# Patient Record
Sex: FEMALE | Race: WHITE | NOT HISPANIC OR LATINO | Employment: OTHER | URBAN - METROPOLITAN AREA
[De-identification: names, ages, dates, MRNs, and addresses within clinical notes are randomized per-mention and may not be internally consistent; named-entity substitution may affect disease eponyms.]

---

## 2017-03-13 ENCOUNTER — RADIANT APPOINTMENT (OUTPATIENT)
Dept: GENERAL RADIOLOGY | Facility: CLINIC | Age: 62
End: 2017-03-13
Attending: FAMILY MEDICINE
Payer: COMMERCIAL

## 2017-03-13 ENCOUNTER — OFFICE VISIT (OUTPATIENT)
Dept: FAMILY MEDICINE | Facility: CLINIC | Age: 62
End: 2017-03-13
Payer: COMMERCIAL

## 2017-03-13 DIAGNOSIS — R68.89 FLU-LIKE SYMPTOMS: Primary | ICD-10-CM

## 2017-03-13 DIAGNOSIS — H61.22 IMPACTED CERUMEN OF LEFT EAR: ICD-10-CM

## 2017-03-13 PROBLEM — Z71.89 ADVANCED DIRECTIVES, COUNSELING/DISCUSSION: Status: ACTIVE | Noted: 2017-03-13

## 2017-03-13 PROCEDURE — 69210 REMOVE IMPACTED EAR WAX UNI: CPT | Performed by: FAMILY MEDICINE

## 2017-03-13 PROCEDURE — 99213 OFFICE O/P EST LOW 20 MIN: CPT | Mod: 25 | Performed by: FAMILY MEDICINE

## 2017-03-13 PROCEDURE — 71020 XR CHEST 2 VW: CPT

## 2017-03-13 RX ORDER — CODEINE PHOSPHATE AND GUAIFENESIN 10; 100 MG/5ML; MG/5ML
1-2 SOLUTION ORAL EVERY 4 HOURS PRN
Qty: 180 ML | Refills: 0 | Status: SHIPPED | OUTPATIENT
Start: 2017-03-13 | End: 2019-04-08

## 2017-03-13 RX ORDER — AZITHROMYCIN 250 MG/1
TABLET, FILM COATED ORAL
Qty: 6 TABLET | Refills: 0 | Status: SHIPPED | OUTPATIENT
Start: 2017-03-13 | End: 2019-03-19

## 2017-03-13 RX ORDER — ACETAMINOPHEN 500 MG
500-1000 TABLET ORAL EVERY 6 HOURS PRN
COMMUNITY
End: 2024-08-14

## 2017-03-13 NOTE — NURSING NOTE
"Chief Complaint   Patient presents with     Cough     URI       Initial /69 (Cuff Size: Adult Regular)  Pulse 97  Temp 99.2  F (37.3  C) (Oral)  Ht 5' 8\" (1.727 m)  Wt 193 lb (87.5 kg)  SpO2 95%  BMI 29.35 kg/m2 Estimated body mass index is 28.59 kg/(m^2) as calculated from the following:    Height as of 5/23/15: 5' 8\" (1.727 m).    Weight as of 10/24/16: 188 lb (85.3 kg).  Medication Reconciliation: complete   Staff and patient were masked during visit.    Natasha Sheets LPN    "

## 2017-03-13 NOTE — MR AVS SNAPSHOT
After Visit Summary   3/13/2017    Christ Ca    MRN: [id number]           Patient Information     Date Of Birth          1955        Visit Information        Provider Department      3/13/2017 10:10 AM Jorge Alberto Hudson MD [facility]        Today's Diagnoses     Flu-like symptoms    -  1    Advanced directives, counseling/discussion          Care Instructions    1. I will contact you with the report of your chest xray.    2. Take Zithromax.    3. Robitussin with codeine as needed - no driving within 4 hours of taking this. Do not mix with alcohol or other sedatives.    4. Send message if not better by next week.          Follow-ups after your visit        Who to contact     If you have questions or need follow up information about today's clinic visit or your schedule please contact [facility] directly at [phone].  Normal or non-critical lab and imaging results will be communicated to you by MyChart, letter or phone within 4 business days after the clinic has received the results. If you do not hear from us within 7 days, please contact the clinic through [employer] or phone. If you have a critical or abnormal lab result, we will notify you by phone as soon as possible.  Submit refill requests through [employer] or call your pharmacy and they will forward the refill request to us. Please allow 3 business days for your refill to be completed.          Additional Information About Your Visit        MyChart Information     [employer] gives you secure access to your electronic health record. If you see a primary care provider, you can also send messages to your care team and make appointments. If you have questions, please call your primary care clinic.  If you do not have a primary care provider, please call [phone] and they will assist you.        Care EveryWhere ID     This is your Care EveryWhere ID. This could be used by other organizations to access your [place]  medical records  [id number]         Blood Pressure from Last 3 Encounters:   10/24/16 129/75   11/13/15 136/82   05/23/15 130/72    Weight from Last 3 Encounters:   10/24/16 188 lb (85.3 kg)   11/13/15 198 lb 6.4 oz (90 kg)   06/04/15 195 lb (88.5 kg)              We Performed the Following     XR Chest 2 Views          Today's Medication Changes          These changes are accurate as of: 3/13/17 11:41 AM.  If you have any questions, ask your nurse or doctor.               Start taking these medicines.        Dose/Directions    azithromycin 250 MG tablet   Commonly known as:  ZITHROMAX   Used for:  Flu-like symptoms   Started by:  Jorge Alberto Hudson MD        Two tablets first day, then one tablet daily for four days.   Quantity:  6 tablet   Refills:  0       guaiFENesin-codeine 100-10 MG/5ML Soln solution   Commonly known as:  ROBITUSSIN AC   Used for:  Flu-like symptoms   Started by:  Jorge Alberto Hudson MD        Dose:  1-2 tsp.   Take 5-10 mLs by mouth every 4 hours as needed for cough   Quantity:  180 mL   Refills:  0            Where to get your medicines      These medications were sent to [facility] [address]  [id number] [place] [address]     Phone:  [phone]     azithromycin 250 MG tablet         Some of these will need a paper prescription and others can be bought over the counter.  Ask your nurse if you have questions.     Bring a paper prescription for each of these medications     guaiFENesin-codeine 100-10 MG/5ML Soln solution                Primary Care Provider Office Phone # Fax #    Latanya Day [phone]       [address] [address]        Thank you!     Thank you for choosing [facility]  for your care. Our goal is always to provide you with excellent care. Hearing back from our patients is one way we can continue to improve our services. Please take a few  minutes to complete the written survey that you may receive in the mail after your visit with us. Thank you!             Your Updated Medication List - Protect others around you: Learn how to safely use, store and throw away your medicines at www.disposemymeds.org.          This list is accurate as of: 3/13/17 11:41 AM.  Always use your most recent med list.                   Brand Name Dispense Instructions for use    albuterol 108 (90 BASE) MCG/ACT Inhaler    PROAIR HFA/PROVENTIL HFA/VENTOLIN HFA    1 Inhaler    Inhale 2 puffs into the lungs every 6 hours       ALLEGRA PO          azithromycin 250 MG tablet    ZITHROMAX    6 tablet    Two tablets first day, then one tablet daily for four days.       guaiFENesin-codeine 100-10 MG/5ML Soln solution    ROBITUSSIN AC    180 mL    Take 5-10 mLs by mouth every 4 hours as needed for cough       MUCINEX PO          NYQUIL PO          TYLENOL 500 MG tablet   Generic drug:  acetaminophen      Take 500-1,000 mg by mouth every 6 hours as needed for mild pain

## 2017-03-13 NOTE — PROGRESS NOTES
"HPI:    Christ Ca is an 62 year old female who presents for evaluation of cold symptoms.    Duration: since 3/11/17  Fever: no  Cough: Productive of non bloody sputum  Shortness of breath: yes  History of asthma or COPD: no  Sore throat: no  Runny nose: yes  Ear pain: left ear    ROS:    Per HPI    SH:    Non smoker    Exam:    /69 (Cuff Size: Adult Regular)  Pulse 97  Temp 99.2  F (37.3  C) (Oral)  Ht 5' 8\" (1.727 m)  Wt 193 lb (87.5 kg)  SpO2 95%  BMI 29.35 kg/m2  Gen: Healthy appearing female in no acute distress  ENT: TM's normal after removing wax from the left ear canal. Oropharynx normal. Oral mucosa moist without lesions.  Eyes: Conjunctiva and sclera normal. Pupils react normally to light. No nystagmus.  Neck: No enlarged lymph nodes, thyromegally or other masses.  Lungs: Good air movement and otherwise clear.  CV: Heart RRR with no murmurs. No JVD, carotid bruits or leg edema.    XR CHEST 2 VW 3/13/2017 11:05 AM     HISTORY: Cough.     COMPARISON: None.     FINDINGS: No airspace consolidation, pleural effusion or pneumothorax.  Normal heart size.         IMPRESSION: No acute cardiopulmonary abnormality.     JUN NGUYEN MD    Assessment and Plan - Decision Making    1. Flu-like symptoms  I think she has viral illness. But because of the shortness of breath and indistinct cardiac borders on CXR, I placed her on Zpak. Robitussin AC for symptom relief.  - XR Chest 2 Views  - guaiFENesin-codeine (ROBITUSSIN AC) 100-10 MG/5ML SOLN solution; Take 5-10 mLs by mouth every 4 hours as needed for cough  Dispense: 180 mL; Refill: 0  - azithromycin (ZITHROMAX) 250 MG tablet; Two tablets first day, then one tablet daily for four days.  Dispense: 6 tablet; Refill: 0    2. Impacted cerumen of left ear  Nursing staff attempted irrigation without success. I used alligator clamp to remove a large, hard piece of wax. She felt better.   - REMOVE IMPACTED CERUMEN      Written instructions given as " follows:    Patient Instructions   1. I will contact you with the report of your chest xray.    2. Take Zithromax.    3. Robitussin with codeine as needed - no driving within 4 hours of taking this. Do not mix with alcohol or other sedatives.    4. Send message if not better by next week.

## 2017-03-13 NOTE — PATIENT INSTRUCTIONS
1. I will contact you with the report of your chest xray.    2. Take Zithromax.    3. Robitussin with codeine as needed - no driving within 4 hours of taking this. Do not mix with alcohol or other sedatives.    4. Send message if not better by next week.

## 2017-03-14 VITALS
BODY MASS INDEX: 29.25 KG/M2 | TEMPERATURE: 99.2 F | HEART RATE: 97 BPM | DIASTOLIC BLOOD PRESSURE: 69 MMHG | HEIGHT: 68 IN | WEIGHT: 193 LBS | SYSTOLIC BLOOD PRESSURE: 132 MMHG | OXYGEN SATURATION: 95 %

## 2017-03-24 ENCOUNTER — TELEPHONE (OUTPATIENT)
Dept: FAMILY MEDICINE | Facility: CLINIC | Age: 62
End: 2017-03-24

## 2017-04-04 ENCOUNTER — TELEPHONE (OUTPATIENT)
Dept: FAMILY MEDICINE | Facility: CLINIC | Age: 62
End: 2017-04-04

## 2017-04-04 NOTE — LETTER
[facility]     [address] [place]  [address]    Phone:  [phone]          Christ MOREIRA Roby                                                                [address] [facility] [address]              April 4, 2017             Our records indicate that you have not scheduled for a(n)mammogram and annual female exam which was recommended by your health care team. Monitoring and managing your preventative and chronic health conditions are very important to us.     You are also due for a fasting lab appointment one week prior to the appointment with the provider.  Nothing to eat or drink, except water 10-12 hours prior to the appointment with the provider.     If you are receiving any of these services at another site please bring in a copy at your next visit so we can get it scanned into your chart.       Please call [phone] or message us through your [employer] account to schedule an appointment or provide information for your chart.     I look forward to seeing you and working with you on your health care needs.     Sincerely,       Jorge Alberto Hudson MD/miryam              *If you have already scheduled an appointment, please disregard this reminder

## 2017-04-04 NOTE — TELEPHONE ENCOUNTER
Panel Management Review      Patient has the following on her problem list:     Asthma review     ACT Total Scores 10/24/2016   ACT TOTAL SCORE -   ASTHMA ER VISITS -   ASTHMA HOSPITALIZATIONS -   ACT TOTAL SCORE (Goal Greater than or Equal to 20) 25   In the past 12 months, how many times did you visit the emergency room for your asthma without being admitted to the hospital? 0   In the past 12 months, how many times were you hospitalized overnight because of your asthma? 0      1. Is Asthma diagnosis on the Problem List? Yes    2. Is Asthma listed on Health Maintenance? Yes    3. Patient is due for:  ACT      Composite cancer screening  Chart review shows that this patient is due/due soon for the following Pap Smear and Mammogram  Summary:    Patient is due/failing the following:   MAMMOGRAM and PAP    Action needed:   Patient needs office visit for a physical with fasting labs.    Type of outreach:    Sent letter.    Questions for provider review:    None                                                                                                                                    Natasha Sheets LPN       Chart routed to Care Team .

## 2017-04-10 NOTE — TELEPHONE ENCOUNTER
Call attempt 2, no answer. Panel management done 4/4/17, noted letter sent out. See latest encounter. Rerouting to TC. NX

## 2017-07-06 ENCOUNTER — TELEPHONE (OUTPATIENT)
Dept: FAMILY MEDICINE | Facility: CLINIC | Age: 62
End: 2017-07-06

## 2017-07-06 NOTE — TELEPHONE ENCOUNTER
Patient has been contacted previously with no response. Will mail letter.Nicolasa Tobias MA/CIELO

## 2017-07-06 NOTE — LETTER
[facility]  [address] [facility] [address] [phone]          July 6, 2017    Christ Ca  [address] [facility] [address]    Dear Christ,          Our records indicate that you have not scheduled for a(n)Mammogram which was recommended by your health care team. Monitoring and managing your preventative and chronic health conditions are very important to us.       If you have received your health care elsewhere, please provide us with that information so it can be documented in your chart.    Please call [phone] or message us through your [employer] account to schedule an appointment or provide information for your chart.     We look forward to seeing you and working with you on your health care needs.     Sincerely,   Jorge Alberto Hudson MD/neda          *If you have already scheduled an appointment, please disregard this reminder

## 2017-10-01 ENCOUNTER — HEALTH MAINTENANCE LETTER (OUTPATIENT)
Age: 62
End: 2017-10-01

## 2018-09-24 ENCOUNTER — OFFICE VISIT (OUTPATIENT)
Dept: FAMILY MEDICINE | Facility: CLINIC | Age: 63
End: 2018-09-24
Payer: COMMERCIAL

## 2018-09-24 VITALS
SYSTOLIC BLOOD PRESSURE: 122 MMHG | WEIGHT: 170 LBS | BODY MASS INDEX: 25.85 KG/M2 | DIASTOLIC BLOOD PRESSURE: 82 MMHG | RESPIRATION RATE: 16 BRPM | OXYGEN SATURATION: 98 % | HEART RATE: 75 BPM

## 2018-09-24 DIAGNOSIS — K59.00 CONSTIPATION, UNSPECIFIED CONSTIPATION TYPE: Primary | ICD-10-CM

## 2018-09-24 PROCEDURE — 99213 OFFICE O/P EST LOW 20 MIN: CPT | Performed by: INTERNAL MEDICINE

## 2018-09-24 ASSESSMENT — PAIN SCALES - GENERAL: PAINLEVEL: MILD PAIN (3)

## 2018-09-24 NOTE — PATIENT INSTRUCTIONS
For helping with keeping stool pattern normal , recommend first a high fiber diet   SOURCES OF HIGH FIBER   1. Beans. Think three-bean salad, bean burritos, chili, soup.  2. Whole grains. That means whole-wheat bread, pasta, etc.  3. Brown rice. White rice doesn't offer much fiber.  4. Popcorn. It's a great source of fiber.  5. Nuts. Almonds, pecans, and walnuts have more fiber than other nuts.  6. Baked potato with skin. It's the skin that's important here.  7. Berries. All those seeds, plus the skin, give great fiber to any berry.  8. Bran cereal. Actually, any cereal that has 5 grams of fiber or more in a serving counts as high fiber.  9. Oatmeal. Whether its microwaved or stove-cooked, oatmeal is good fiber.  10. Vegetables. The crunchier, the better.    If these diet modifications are not enough add metamucil first thing in the morning every day - this is an over the counter nonprescription product  If this is not enough then you have to add a nightly dose of miralax [ Glycolax ] - this is an over the counter nonprescription product  [facility]    If you have any questions regarding to your visit please contact your care team:     Team Pink:   Clinic Hours Telephone Number   Internal Medicine:  Dr. Bridget Rebolledo, NP 7am-7pm  Monday - Thursday   7am-5pm  Fridays  [phone]  (Appointment scheduling available 24/7)   Urgent Care - [place] and [place] - 11am-9pm Monday-Friday Saturday-Sunday- 9am-5pm   [place] - 5pm-9pm Monday-Friday Saturday-Sunday- 9am-5pm  [phone] - [place]  [phone] - [place]       What options do I have for a visit other than an office visit? We offer electronic visits (e-visits) and telephone visits, when medically appropriate.  Please check with your medical insurance to see if these types of visits are covered, as you will be responsible for any charges that are not paid by your insurance.      You can  use [employer] (secure electronic communication) to access to your chart, send your primary care provider a message, or make an appointment. Ask a team member how to get started.     For a price quote for your services, please call our Consumer Price Line at [phone] or our Imaging Cost estimation line at [phone] (for imaging tests).    [place]

## 2018-09-24 NOTE — MR AVS SNAPSHOT
After Visit Summary   9/24/2018    Christ Ca    MRN: [id number]           Patient Information     Date Of Birth          1955        Visit Information        Provider Department      9/24/2018 11:30 AM Sean Hemphill MD [facility] Instructions    For helping with keeping stool pattern normal , recommend first a high fiber diet   SOURCES OF HIGH FIBER   1. Beans. Think three-bean salad, bean burritos, chili, soup.  2. Whole grains. That means whole-wheat bread, pasta, etc.  3. Brown rice. White rice doesn't offer much fiber.  4. Popcorn. It's a great source of fiber.  5. Nuts. Almonds, pecans, and walnuts have more fiber than other nuts.  6. Baked potato with skin. It's the skin that's important here.  7. Berries. All those seeds, plus the skin, give great fiber to any berry.  8. Bran cereal. Actually, any cereal that has 5 grams of fiber or more in a serving counts as high fiber.  9. Oatmeal. Whether its microwaved or stove-cooked, oatmeal is good fiber.  10. Vegetables. The crunchier, the better.    If these diet modifications are not enough add metamucil first thing in the morning every day - this is an over the counter nonprescription product  If this is not enough then you have to add a nightly dose of miralax [ Glycolax ] - this is an over the counter nonprescription product  [facility]    If you have any questions regarding to your visit please contact your care team:     Team Pink:   Clinic Hours Telephone Number   Internal Medicine:  Dr. Bridget Rebolledo NP 7am-7pm  Monday - Thursday   7am-5pm  Fridays  [phone]  (Appointment scheduling available 24/7)   Urgent Care - [place] - 11am-9pm Monday-Friday Saturday-Sunday- 9am-5pm   [place] - 5pm-9pm Monday-Friday Saturday-Sunday- 9am-5pm  [phone] - [place]  [phone] - [place]       What options do I have for a  visit other than an office visit? We offer electronic visits (e-visits) and telephone visits, when medically appropriate.  Please check with your medical insurance to see if these types of visits are covered, as you will be responsible for any charges that are not paid by your insurance.      You can use [employer] (secure electronic communication) to access to your chart, send your primary care provider a message, or make an appointment. Ask a team member how to get started.     For a price quote for your services, please call our Consumer Price Line at [phone] or our Imaging Cost estimation line at [phone] (for imaging tests).    Kamilla            Follow-ups after your visit        Who to contact     If you have questions or need follow up information about today's clinic visit or your schedule please contact [facility]\"" directly at [phone].  Normal or non-critical lab and imaging results will be communicated to you by [employer], letter or phone within 4 business days after the clinic has received the results. If you do not hear from us within 7 days, please contact the clinic through [employer] or phone. If you have a critical or abnormal lab result, we will notify you by phone as soon as possible.  Submit refill requests through [employer] or call your pharmacy and they will forward the refill request to us. Please allow 3 business days for your refill to be completed.          Additional Information About Your Visit        MyChart Information     [employer] gives you secure access to your electronic health record. If you see a primary care provider, you can also send messages to your care team and make appointments. If you have questions, please call your primary care clinic.  If you do not have a primary care provider, please call [phone] and they will assist you.        Care EveryWhere ID     This is your Care EveryWhere ID. This could be used by other organizations to access your [place]  medical records  [id number]        Your Vitals Were     Pulse Respirations Pulse Oximetry BMI (Body Mass Index)          75 16 98% 25.85 kg/m2         Blood Pressure from Last 3 Encounters:   09/24/18 122/82   03/14/17 132/69   10/24/16 129/75    Weight from Last 3 Encounters:   09/24/18 170 lb (77.1 kg)   03/14/17 193 lb (87.5 kg)   10/24/16 188 lb (85.3 kg)              Today, you had the following     No orders found for display       Primary Care Provider Office Phone # Fax #    Latanya Day [phone]       [address] [facility] [address]        Equal Access to Services     QUINTON LEE : Hadii tin robleso Soananda, waaxda luqadaha, qaybta kaalmada adeegyada, lyric duff . So [facility] [phone].    ATENCIÓN: Si habla español, tiene a butts disposición servicios gratuitos de asistencia lingüística. [facility] [phone].    We comply with applicable federal civil rights laws and [place] laws. We do not discriminate on the basis of race, color, national origin, age, disability, sex, sexual orientation, or gender identity.            Thank you!     Thank you for choosing [facility]  for your care. Our goal is always to provide you with excellent care. Hearing back from our patients is one way we can continue to improve our services. Please take a few minutes to complete the written survey that you may receive in the mail after your visit with us. Thank you!             Your Updated Medication List - Protect others around you: Learn how to safely use, store and throw away your medicines at www.disposemymeds.org.          This list is accurate as of 9/24/18 11:59 AM.  Always use your most recent med list.                   Brand Name Dispense Instructions for use Diagnosis    albuterol 108 (90 Base) MCG/ACT inhaler    PROAIR HFA/PROVENTIL HFA/VENTOLIN HFA    1 Inhaler    Inhale 2 puffs into the lungs every 6 hours    Family history of diabetes  mellitus       ALLEGRA PO           azithromycin 250 MG tablet    ZITHROMAX    6 tablet    Two tablets first day, then one tablet daily for four days.    Flu-like symptoms       guaiFENesin-codeine 100-10 MG/5ML Soln solution    ROBITUSSIN AC    180 mL    Take 5-10 mLs by mouth every 4 hours as needed for cough    Flu-like symptoms       MUCINEX PO           NYQUIL PO           TYLENOL 500 MG tablet   Generic drug:  acetaminophen      Take 500-1,000 mg by mouth every 6 hours as needed for mild pain

## 2018-09-24 NOTE — PROGRESS NOTES
SUBJECTIVE:   Christ Ca is a 63 year old female who presents to clinic today for the following health issues:    Constipation  About 12 days ago Christ reports throwing out her back while displaying some dance moves. She denies any falls at that time. After her injury she noted not passing any stools for several days. She currently has a mild abdominal discomfort bilaterally. She used MiraLax / GlycoLax (polyethylene glycol) a couple different days since but not daily. She says that every 3 days or so since, she has a very small bowel movements. She also tried using a fleets enema and noted no relief.  She has been taking tylenol or ibuprofen for her pain. She has resumed normal physical activity, which includes walking and yoga. However her constipation has persisted. This has been ongoing now for today is the  day were she feels ineffective or incomplete bowel movement     Problem list and histories reviewed & adjusted, as indicated.  Additional history: as documented    Patient Active Problem List   Diagnosis     Chronic rhinitis     Intermittent asthma     CARDIOVASCULAR SCREENING; LDL GOAL LESS THAN 160     Chronic cough     Advanced directives, counseling/discussion     Past Surgical History:   Procedure Laterality Date     C/SECTION, LOW TRANSVERSE      , Low Transverse     SURGICAL HISTORY OF -   age 9    Eye     TONSILLECTOMY         Social History   Substance Use Topics     Smoking status: Never Smoker     Smokeless tobacco: Never Used     Alcohol use 0.0 oz/week      Comment: ocass     Family History   Problem Relation Age of Onset     Cancer - colorectal Mother      age 78     Cancer Mother      Lung ca age 72     HEART DISEASE Mother      age 72     Alcohol/Drug Father      Alzheimer Disease Father      Depression Brother      1      Depression Sister      has Depression,1 sister has Bipolar     HEART DISEASE Brother      age 40         BP Readings from Last 3 Encounters:   18  122/82   03/14/17 132/69   10/24/16 129/75    Wt Readings from Last 3 Encounters:   09/24/18 77.1 kg (170 lb)   03/14/17 87.5 kg (193 lb)   10/24/16 85.3 kg (188 lb)         Reviewed and updated as needed this visit by clinical staff  Tobacco  Allergies  Meds  Med Hx  Surg Hx  Fam Hx  Soc Hx      Reviewed and updated as needed this visit by Provider       ROS:  Constitutional, HEENT, cardiovascular, pulmonary, GI, , musculoskeletal, neuro, skin, endocrine and psych systems are negative, except as otherwise noted.    This document serves as a record of the services and decisions personally performed and made by Sean Hemphill MD. It was created on his behalf by Manoj Moya, a trained medical scribe. The creation of this document is based on the provider's statements to the medical scribe.  Manjo Moya 11:43 AM September 24, 2018    OBJECTIVE:     /82  Pulse 75  Resp 16  Wt 77.1 kg (170 lb)  SpO2 98%  BMI 25.85 kg/m2  Body mass index is 25.85 kg/(m^2).  GENERAL: healthy, alert and no distress  EYES: Eyes grossly normal to inspection, PERRL and conjunctivae and sclerae normal  SKIN: no suspicious lesions or rashes to visible skin  NEURO: Normal strength and tone, mentation intact and speech normal  PSYCH: mentation appears normal, affect normal/bright  abdomen normal bowel sounds and no hepatosplenomegaly or masses    Diagnostic Test Results:  No results found for this or any previous visit (from the past 24 hour(s)).    ASSESSMENT/PLAN:     (K59.00) Constipation, unspecified constipation type  (primary encounter diagnosis)  Comment: this is a routine type of condition that periodically cause a person troubles. There are no features here of obstipation or obstruction . We have reason for optimism that this condition with normalized. I discussed a detailed plan of care   Plan: see patient after-visit summary      See Patient Instructions    The information in this document, created by the medical  scribe for me, accurately reflects the services I personally performed and the decisions made by me. I have reviewed and approved this document for accuracy prior to leaving the patient care area.  September 24, 2018 11:49 AM    Sean Hemphill MD  [facility]

## 2018-09-25 ASSESSMENT — ASTHMA QUESTIONNAIRES: ACT_TOTALSCORE: 23

## 2019-03-19 ENCOUNTER — ANCILLARY PROCEDURE (OUTPATIENT)
Dept: GENERAL RADIOLOGY | Facility: CLINIC | Age: 64
End: 2019-03-19
Attending: PHYSICIAN ASSISTANT
Payer: COMMERCIAL

## 2019-03-19 ENCOUNTER — OFFICE VISIT (OUTPATIENT)
Dept: FAMILY MEDICINE | Facility: CLINIC | Age: 64
End: 2019-03-19
Payer: COMMERCIAL

## 2019-03-19 VITALS
WEIGHT: 164 LBS | OXYGEN SATURATION: 100 % | DIASTOLIC BLOOD PRESSURE: 74 MMHG | HEART RATE: 77 BPM | BODY MASS INDEX: 24.94 KG/M2 | TEMPERATURE: 97.8 F | SYSTOLIC BLOOD PRESSURE: 144 MMHG

## 2019-03-19 DIAGNOSIS — R05.9 COUGH: ICD-10-CM

## 2019-03-19 DIAGNOSIS — R07.81 PLEURODYNIA: Primary | ICD-10-CM

## 2019-03-19 LAB — D DIMER PPP FEU-MCNC: 0.3 UG/ML FEU (ref 0–0.5)

## 2019-03-19 PROCEDURE — 93000 ELECTROCARDIOGRAM COMPLETE: CPT | Performed by: PHYSICIAN ASSISTANT

## 2019-03-19 PROCEDURE — 85379 FIBRIN DEGRADATION QUANT: CPT | Performed by: PHYSICIAN ASSISTANT

## 2019-03-19 PROCEDURE — 71046 X-RAY EXAM CHEST 2 VIEWS: CPT

## 2019-03-19 PROCEDURE — 99215 OFFICE O/P EST HI 40 MIN: CPT | Performed by: PHYSICIAN ASSISTANT

## 2019-03-19 PROCEDURE — 36415 COLL VENOUS BLD VENIPUNCTURE: CPT | Performed by: PHYSICIAN ASSISTANT

## 2019-03-19 NOTE — PROGRESS NOTES
SUBJECTIVE:   Christ Ca is a 64 year old female who presents to clinic today for the following health issues:      RESPIRATORY SYMPTOMS      Duration: X 1 week    Description  cough and chest pain - right side worse with cough, deep breath    Severity: moderate    Accompanying signs and symptoms: None    History (predisposing factors):  none    Precipitating or alleviating factors: None    Therapies tried and outcome:  Ibuprofen, inhaler    Does have a history of chronic cough. Nonproductive. Takes Allegra daily for allergies.  Noticed the onset of right chest pain while in [place].  Pain is not worse with exertion.  Pain is worse with a deep breath.  However pain is not worse with twisting/rotation of the trunk.  Flew back from [place] March/11/2019.  No shortness of breath.  No dizziness.  No leg pain or swelling.  She is not on any hormone replacement. No recent surgeries.  Pain is worse with a deep breath. No h/o CA  Has asthma  No rash or sore throat.  No nausea or vomiting.    Allergies   Allergen Reactions     Sulfa Drugs        Past Medical History:   Diagnosis Date     Asthma      GERD (gastroesophageal reflux disease)          Current Outpatient Medications on File Prior to Visit:  albuterol (PROAIR HFA, PROVENTIL HFA, VENTOLIN HFA) 108 (90 BASE) MCG/ACT inhaler Inhale 2 puffs into the lungs every 6 hours   Fexofenadine HCl (ALLEGRA PO)    acetaminophen (TYLENOL) 500 MG tablet Take 500-1,000 mg by mouth every 6 hours as needed for mild pain   GuaiFENesin (MUCINEX PO)    guaiFENesin-codeine (ROBITUSSIN AC) 100-10 MG/5ML SOLN solution Take 5-10 mLs by mouth every 4 hours as needed for cough (Patient not taking: Reported on 3/19/2019)   Pseudoeph-Doxylamine-DM-APAP (NYQUIL PO)      No current facility-administered medications on file prior to visit.     Social History     Tobacco Use     Smoking status: Never Smoker     Smokeless tobacco: Never Used   Substance Use Topics     Alcohol use: Yes      Alcohol/week: 0.0 oz     Comment: ocass       ROS:  CONSTITUTIONAL: Negative for fatigue or fever.  EYES: Negative for eye problems.  ENT: As above.  RESP: As above.  CV: as above.  GI: Negative for vomiting.  MUSCULOSKELETAL:  Negative for significant muscle or joint pains.  NEUROLOGIC: Negative for headaches.  SKIN: Negative for rash.  Psych- nl mentation    OBJECTIVE:  /74   Pulse 77   Temp 97.8  F (36.6  C) (Oral)   Wt 74.4 kg (164 lb)   SpO2 100%   BMI 24.94 kg/m    GENERAL APPEARANCE: Healthy, alert and no distress.  EYES:Conjunctiva/sclera clear.  EARS: No cerumen.   Ear canals w/o erythema.  TM's intact w/o erythema.    NOSE/MOUTH: Nose without ulcers, erythema or lesions.  SINUSES: No maxillary sinus tenderness.  THROAT: No erythema w/o tonsillar enlargement . No exudates.  NECK: Supple, nontender, no lymphadenopathy.  RESP: Lungs clear to auscultation - no rales, rhonchi or wheezes  CV: Regular rate and rhythm, normal S1 S2, no murmur noted.  NEURO: Awake, alert    SKIN: No rashes  Chest wall-no sternal costal tenderness.  The area she feels the pain is right side upper breast/chest wall.  Abdomen-soft, nontender  Legs-no swelling.  Negative Homans.  EKG- rate 72. No e/o pulmonary embolus( No large S in I, no Q in III, no T inversion in III). No acute findings. Read as low voltage with rightward P axis and rotation- possible pulmonary disease.    ASSESSMENT:     ICD-10-CM    1. Pleurodynia R07.81 EKG 12-lead complete w/read - Clinics     D dimer, quantitative   2. Cough R05 XR Chest 2 Views           PLAN: Pleurisy is most commonly caused by a viral illness but it can also be caused by a pulmonary embolus.  I explained that I cannot rule this out.  There is a D-dimer test for screening but I get it back in a timely fashion.  I recommend she go to the emergency room for evaluation.  I told her that people can die from pulmonary embolism.  She refuses.  She wants me to do the D dimer test here and  is fully aware of possible consequences.  She is to keep her cell phone on her at all times.  If it is elevated at all again I will recommend that she goes to the emergency room.  If negative use OTC Advil 200mg, 3-4 by mouth 3 times a day with food for 10 days  Zakia Hodges PA-C

## 2019-03-20 ENCOUNTER — MYC REFILL (OUTPATIENT)
Dept: FAMILY MEDICINE | Facility: CLINIC | Age: 64
End: 2019-03-20

## 2019-03-20 ENCOUNTER — TELEPHONE (OUTPATIENT)
Dept: FAMILY MEDICINE | Facility: CLINIC | Age: 64
End: 2019-03-20

## 2019-03-20 DIAGNOSIS — Z83.3 FAMILY HISTORY OF DIABETES MELLITUS: ICD-10-CM

## 2019-03-20 DIAGNOSIS — R09.1 PLEURISY: Primary | ICD-10-CM

## 2019-03-20 RX ORDER — ALBUTEROL SULFATE 90 UG/1
1-2 AEROSOL, METERED RESPIRATORY (INHALATION) EVERY 4 HOURS PRN
Qty: 18 G | Refills: 0 | Status: SHIPPED | OUTPATIENT
Start: 2019-03-20 | End: 2020-06-16

## 2019-03-20 RX ORDER — ALBUTEROL SULFATE 90 UG/1
2 AEROSOL, METERED RESPIRATORY (INHALATION) EVERY 6 HOURS
Status: CANCELLED | OUTPATIENT
Start: 2019-03-20

## 2019-03-20 NOTE — TELEPHONE ENCOUNTER
Pt seen by same day provider yesterday with dx of pleurisy. Pt requesting prescription refill of albuterol inhaler.  To same day provider to advise.  Kristin SANTIAGON, RN

## 2019-04-07 ASSESSMENT — ENCOUNTER SYMPTOMS
SORE THROAT: 0
FREQUENCY: 0
MYALGIAS: 0
FEVER: 0
JOINT SWELLING: 0
HEADACHES: 1
PARESTHESIAS: 0
NAUSEA: 0
DYSURIA: 0
HEMATOCHEZIA: 0
WEAKNESS: 0
DIARRHEA: 0
CHILLS: 0
COUGH: 0
DIZZINESS: 0
HEARTBURN: 0
SHORTNESS OF BREATH: 0
ARTHRALGIAS: 0
CONSTIPATION: 0
ABDOMINAL PAIN: 0
PALPITATIONS: 0
EYE PAIN: 0
NERVOUS/ANXIOUS: 0
HEMATURIA: 0
BREAST MASS: 0

## 2019-04-07 ASSESSMENT — ACTIVITIES OF DAILY LIVING (ADL): CURRENT_FUNCTION: NO ASSISTANCE NEEDED

## 2019-04-08 ENCOUNTER — OFFICE VISIT (OUTPATIENT)
Dept: FAMILY MEDICINE | Facility: CLINIC | Age: 64
End: 2019-04-08
Payer: COMMERCIAL

## 2019-04-08 VITALS
BODY MASS INDEX: 24.96 KG/M2 | HEART RATE: 82 BPM | WEIGHT: 159 LBS | HEIGHT: 67 IN | DIASTOLIC BLOOD PRESSURE: 70 MMHG | SYSTOLIC BLOOD PRESSURE: 138 MMHG | OXYGEN SATURATION: 99 % | RESPIRATION RATE: 16 BRPM | TEMPERATURE: 97.7 F

## 2019-04-08 DIAGNOSIS — Z12.11 SCREEN FOR COLON CANCER: ICD-10-CM

## 2019-04-08 DIAGNOSIS — Z71.89 ADVANCED DIRECTIVES, COUNSELING/DISCUSSION: ICD-10-CM

## 2019-04-08 DIAGNOSIS — Z12.4 SCREENING FOR MALIGNANT NEOPLASM OF CERVIX: ICD-10-CM

## 2019-04-08 DIAGNOSIS — Z23 NEED FOR PROPHYLACTIC VACCINATION AGAINST STREPTOCOCCUS PNEUMONIAE (PNEUMOCOCCUS): ICD-10-CM

## 2019-04-08 DIAGNOSIS — S16.1XXA STRAIN OF NECK MUSCLE, INITIAL ENCOUNTER: ICD-10-CM

## 2019-04-08 DIAGNOSIS — N95.2 ATROPHIC VAGINITIS: ICD-10-CM

## 2019-04-08 DIAGNOSIS — Z12.31 VISIT FOR SCREENING MAMMOGRAM: ICD-10-CM

## 2019-04-08 DIAGNOSIS — Z00.01 ENCOUNTER FOR ROUTINE ADULT PHYSICAL EXAM WITH ABNORMAL FINDINGS: Primary | ICD-10-CM

## 2019-04-08 DIAGNOSIS — Z78.0 ASYMPTOMATIC POSTMENOPAUSAL STATUS: ICD-10-CM

## 2019-04-08 DIAGNOSIS — Z83.3 FAMILY HISTORY OF DIABETES MELLITUS: ICD-10-CM

## 2019-04-08 DIAGNOSIS — Z23 NEED FOR PROPHYLACTIC VACCINATION AND INOCULATION AGAINST INFLUENZA: ICD-10-CM

## 2019-04-08 DIAGNOSIS — J45.20 MILD INTERMITTENT ASTHMA WITHOUT COMPLICATION: ICD-10-CM

## 2019-04-08 DIAGNOSIS — Z11.59 NEED FOR HEPATITIS C SCREENING TEST: ICD-10-CM

## 2019-04-08 DIAGNOSIS — Z11.4 SCREENING FOR HIV (HUMAN IMMUNODEFICIENCY VIRUS): ICD-10-CM

## 2019-04-08 LAB
DEPRECATED CALCIDIOL+CALCIFEROL SERPL-MC: 62 UG/L (ref 20–75)
GLUCOSE SERPL-MCNC: 72 MG/DL (ref 70–99)
HIV 1+2 AB+HIV1 P24 AG SERPL QL IA: NONREACTIVE

## 2019-04-08 PROCEDURE — 99213 OFFICE O/P EST LOW 20 MIN: CPT | Mod: 25 | Performed by: FAMILY MEDICINE

## 2019-04-08 PROCEDURE — G0145 SCR C/V CYTO,THINLAYER,RESCR: HCPCS | Performed by: FAMILY MEDICINE

## 2019-04-08 PROCEDURE — 87389 HIV-1 AG W/HIV-1&-2 AB AG IA: CPT | Performed by: FAMILY MEDICINE

## 2019-04-08 PROCEDURE — 80061 LIPID PANEL: CPT | Performed by: FAMILY MEDICINE

## 2019-04-08 PROCEDURE — 87624 HPV HI-RISK TYP POOLED RSLT: CPT | Performed by: FAMILY MEDICINE

## 2019-04-08 PROCEDURE — G0009 ADMIN PNEUMOCOCCAL VACCINE: HCPCS | Performed by: FAMILY MEDICINE

## 2019-04-08 PROCEDURE — 82947 ASSAY GLUCOSE BLOOD QUANT: CPT | Performed by: FAMILY MEDICINE

## 2019-04-08 PROCEDURE — G0008 ADMIN INFLUENZA VIRUS VAC: HCPCS | Performed by: FAMILY MEDICINE

## 2019-04-08 PROCEDURE — 82306 VITAMIN D 25 HYDROXY: CPT | Performed by: FAMILY MEDICINE

## 2019-04-08 PROCEDURE — G0402 INITIAL PREVENTIVE EXAM: HCPCS | Performed by: FAMILY MEDICINE

## 2019-04-08 PROCEDURE — 90670 PCV13 VACCINE IM: CPT | Performed by: FAMILY MEDICINE

## 2019-04-08 PROCEDURE — 36415 COLL VENOUS BLD VENIPUNCTURE: CPT | Performed by: FAMILY MEDICINE

## 2019-04-08 PROCEDURE — 90662 IIV NO PRSV INCREASED AG IM: CPT | Performed by: FAMILY MEDICINE

## 2019-04-08 PROCEDURE — 86803 HEPATITIS C AB TEST: CPT | Performed by: FAMILY MEDICINE

## 2019-04-08 RX ORDER — ALBUTEROL SULFATE 90 UG/1
2 AEROSOL, METERED RESPIRATORY (INHALATION) EVERY 6 HOURS
Qty: 6.7 G | Refills: 3 | Status: SHIPPED | OUTPATIENT
Start: 2019-04-08 | End: 2024-08-14

## 2019-04-08 ASSESSMENT — ENCOUNTER SYMPTOMS
FREQUENCY: 0
BREAST MASS: 0
SHORTNESS OF BREATH: 0
DYSURIA: 0
EYE PAIN: 0
CHILLS: 0
HEADACHES: 0
JOINT SWELLING: 0
COUGH: 0
DIZZINESS: 0
SORE THROAT: 0
DIARRHEA: 0
ABDOMINAL PAIN: 0
HEARTBURN: 0
ARTHRALGIAS: 0
PALPITATIONS: 0
PARESTHESIAS: 0
CONSTIPATION: 0
NERVOUS/ANXIOUS: 0
NAUSEA: 0
FEVER: 0
HEMATURIA: 0
HEMATOCHEZIA: 0
MYALGIAS: 0
WEAKNESS: 0

## 2019-04-08 ASSESSMENT — MIFFLIN-ST. JEOR: SCORE: 1302.81

## 2019-04-08 ASSESSMENT — ACTIVITIES OF DAILY LIVING (ADL): CURRENT_FUNCTION: NO ASSISTANCE NEEDED

## 2019-04-08 NOTE — PROGRESS NOTES
Injectable Influenza Immunization Documentation    1.  Is the person to be vaccinated sick today?   No    2. Does the person to be vaccinated have an allergy to a component   of the vaccine?   No  Egg Allergy Algorithm Link    3. Has the person to be vaccinated ever had a serious reaction   to influenza vaccine in the past?   No    4. Has the person to be vaccinated ever had Guillain-Barré syndrome?   No    Form completed by ANITRA Johns CMA ([facility])

## 2019-04-08 NOTE — LETTER
My Asthma Action Plan  Name: Christ Ca   YOB: 1955  Date: 4/8/2019   My doctor: Latanya Day MD   My clinic: [facility]        My Control Medicine: None  My Rescue Medicine: Albuterol (Proair/Ventolin/Proventil) inhaler .   My Asthma Severity: intermittent  Avoid your asthma triggers: upper respiratory infections               GREEN ZONE   Good Control    I feel good    No cough or wheeze    Can work, sleep and play without asthma symptoms       Take your asthma control medicine every day.     1. If exercise triggers your asthma, take your rescue medication    15 minutes before exercise or sports, and    During exercise if you have asthma symptoms  2. Spacer to use with inhaler: If you have a spacer, make sure to use it with your inhaler             YELLOW ZONE Getting Worse  I have ANY of these:    I do not feel good    Cough or wheeze    Chest feels tight    Wake up at night   1. Keep taking your Green Zone medications  2. Start taking your rescue medicine:    every 20 minutes for up to 1 hour. Then every 4 hours for 24-48 hours.  3. If you stay in the Yellow Zone for more than 12-24 hours, contact your doctor.  4. If you do not return to the Green Zone in 12-24 hours or you get worse, start taking your oral steroid medicine if prescribed by your provider.           RED ZONE Medical Alert - Get Help  I have ANY of these:    I feel awful    Medicine is not helping    Breathing getting harder    Trouble walking or talking    Nose opens wide to breathe       1. Take your rescue medicine NOW  2. If your provider has prescribed an oral steroid medicine, start taking it NOW  3. Call your doctor NOW  4. If you are still in the Red Zone after 20 minutes and you have not reached your doctor:    Take your rescue medicine again and    Call 911 or go to the emergency room right away    See your regular doctor within 2 weeks of an Emergency Room or Urgent Care visit for follow-up treatment.           Annual Reminders:  Meet with Asthma Educator,  Flu Shot in the Fall, consider Pneumonia Vaccination for patients with asthma (aged 19 and older).    Ph[id number] [address] [facility] [facility]                      Asthma Triggers  How To Control Things That Make Your Asthma Worse    Triggers are things that make your asthma worse.  Look at the list below to help you find your triggers and what you can do about them.  You can help prevent asthma flare-ups by staying away from your triggers.      Trigger                                                          What you can do   Cigarette Smoke  Tobacco smoke can make asthma worse. Do not allow smoking in your home, car or around you.  Be sure no one smokes at a child s day care or school.  If you smoke, ask your health care provider for ways to help you quit.  Ask family members to quit too.  Ask your health care provider for a referral to Quit Plan to help you quit smoking, or call 0-007-859-PLAN.     Colds, Flu, Bronchitis  These are common triggers of asthma. Wash your hands often.  Don t touch your eyes, nose or mouth.  Get a flu shot every year.     Dust Mites  These are tiny bugs that live in cloth or carpet. They are too small to see. Wash sheets and blankets in hot water every week.   Encase pillows and mattress in dust mite proof covers.  Avoid having carpet if you can. If you have carpet, vacuum weekly.   Use a dust mask and HEPA vacuum.   Pollen and Outdoor Mold  Some people are allergic to trees, grass, or weed pollen, or molds. Try to keep your windows closed.  Limit time out doors when pollen count is high.   Ask you health care provider about taking medicine during allergy season.     Animal Dander  Some people are allergic to skin flakes, urine or saliva from pets with fur or feathers. Keep pets with fur or feathers out of your home.    If you can t keep the pet outdoors, then keep  the pet out of your bedroom.  Keep the bedroom door closed.  Keep pets off cloth furniture and away from stuffed toys.     Mice, Rats, and Cockroaches  Some people are allergic to the waste from these pests.   Cover food and garbage.  Clean up spills and food crumbs.  Store grease in the refrigerator.   Keep food out of the bedroom.   Indoor Mold  This can be a trigger if your home has high moisture. Fix leaking faucets, pipes, or other sources of water.   Clean moldy surfaces.  Dehumidify basement if it is damp and smelly.   Smoke, Strong Odors, and Sprays  These can reduce air quality. Stay away from strong odors and sprays, such as perfume, powder, hair spray, paints, smoke incense, paint, cleaning products, candles and new carpet.   Exercise or Sports  Some people with asthma have this trigger. Be active!  Ask your doctor about taking medicine before sports or exercise to prevent symptoms.    Warm up for 5-10 minutes before and after sports or exercise.     Other Triggers of Asthma  Cold air:  Cover your nose and mouth with a scarf.  Sometimes laughing or crying can be a trigger.  Some medicines and food can trigger asthma.

## 2019-04-08 NOTE — PROGRESS NOTES
"SUBJECTIVE:   Christ Ca is a 64 year old female who presents for Preventive Visit.  Are you in the first 12 months of your Medicare coverage?  Yes,  Visual Acuity:  Right Eye: 20/32   Left Eye: 20/25  Both Eyes: 20/25    Healthy Habits:     In general, how would you rate your overall health?  Excellent    Frequency of exercise:  4-5 days/week    Duration of exercise:  30-45 minutes    Do you usually eat at least 4 servings of fruit and vegetables a day, include whole grains    & fiber and avoid regularly eating high fat or \"junk\" foods?  Yes    Taking medications regularly:  Yes    Medication side effects:  None    Ability to successfully perform activities of daily living:  No assistance needed    Home Safety:  No safety concerns identified    Hearing Impairment:  No hearing concerns    In the past 6 months, have you been bothered by leaking of urine?  No    In general, how would you rate your overall mental or emotional health?  Excellent      PHQ-2 Total Score: 0    Additional concerns today:  No    Do you feel safe in your environment? Yes    Do you have a Health Care Directive? No: Advance care planning reviewed with patient; information given to patient to review.    Fall risk  Fallen 2 or more times in the past year?: No  Any fall with injury in the past year?: No    Cognitive Screening   1) Repeat 3 items (Leader, Season, Table)    2) Clock draw: NORMAL  3) 3 item recall: Recalls 3 objects  Results: 3 items recalled: COGNITIVE IMPAIRMENT LESS LIKELY    Mini-CogTM Copyright MEÑO Foster. Licensed by the author for use in [facility]; reprinted with permission (tl@.[facility]). All rights reserved.      Do you have sleep apnea, excessive snoring or daytime drowsiness?: no    Reviewed and updated as needed this visit by clinical staff  Tobacco  Allergies  Meds  Med Hx  Surg Hx  Fam Hx  Soc Hx        Reviewed and updated as needed this visit by Provider        Social History     Tobacco Use     " Smoking status: Never Smoker     Smokeless tobacco: Never Used     Tobacco comment: Both Parents smoked   Substance Use Topics     Alcohol use: Yes     Alcohol/week: 0.0 oz     Comment: One glass once a week or less         Alcohol Use 4/7/2019   Prescreen: >3 drinks/day or >7 drinks/week? No         Current providers sharing in care for this patient include:   Patient Care Team:  Latanya Day MD as PCP - General  Sean Hemphill MD as Assigned PCP    The following health maintenance items are reviewed in Epic and correct as of today:  Health Maintenance   Topic Date Due     HIV SCREEN (SYSTEM ASSIGNED)  03/12/1972     HEPATITIS C SCREENING  03/12/1972     ZOSTER IMMUNIZATION (1 of 2) 03/12/2004     MAMMO SCREEN Q2 YR (SYSTEM ASSIGNED)  12/09/2012     PAP SCREENING Q3 YR (SYSTEM ASSIGNED)  10/22/2013     LIPID SCREEN Q5 YR FEMALE (SYSTEM ASSIGNED)  10/22/2015     ASTHMA ACTION PLAN Q1 YR  10/24/2017     FIT Q1 YR  11/06/2017     INFLUENZA VACCINE (1) 09/01/2018     PNEUMOCOCCAL IMMUNIZATION 65+ LOW/MEDIUM RISK (1 of 2 - PCV13) 03/12/2019     MEDICARE ANNUAL WELLNESS VISIT  03/12/2019     FALL RISK ASSESSMENT  03/12/2019     DEXA SCAN SCREENING (SYSTEM ASSIGNED)  03/12/2019     ASTHMA CONTROL TEST Q6 MOS  03/24/2019     PHQ-2 Q1 YR  04/08/2020     DTAP/TDAP/TD IMMUNIZATION (3 - Td) 10/22/2020     ADVANCE DIRECTIVE PLANNING Q5 YRS  03/13/2022     IPV IMMUNIZATION  Aged Out     MENINGITIS IMMUNIZATION  Aged Out     Labs reviewed in EPIC  BP Readings from Last 3 Encounters:   04/08/19 138/70   03/19/19 144/74   09/24/18 122/82    Wt Readings from Last 3 Encounters:   04/08/19 72.1 kg (159 lb)   03/19/19 74.4 kg (164 lb)   09/24/18 77.1 kg (170 lb)                  Patient Active Problem List   Diagnosis     Chronic rhinitis     Intermittent asthma     CARDIOVASCULAR SCREENING; LDL GOAL LESS THAN 160     Chronic cough     Advanced directives, counseling/discussion     Past Surgical History:   Procedure Laterality Date      ABDOMEN SURGERY  3/31/79    Cesaerean     C/SECTION, LOW TRANSVERSE      , Low Transverse     COLONOSCOPY       SURGICAL HISTORY OF -   age 9    Eye     TONSILLECTOMY         Social History     Tobacco Use     Smoking status: Never Smoker     Smokeless tobacco: Never Used     Tobacco comment: Both Parents smoked   Substance Use Topics     Alcohol use: Yes     Alcohol/week: 0.0 oz     Comment: One glass once a week or less     Family History   Problem Relation Age of Onset     Cancer - colorectal Mother         age 78     Cancer Mother         Lung ca age 72     Heart Disease Mother         age 72     Coronary Artery Disease Mother      Hypertension Mother      Hyperlipidemia Mother      Colon Cancer Mother      Alcohol/Drug Father      Alzheimer Disease Father      Depression Brother         1      Depression Sister         has Depression,1 sister has Bipolar     Heart Disease Brother         age 40     Substance Abuse Sister          Current Outpatient Medications   Medication Sig Dispense Refill     acetaminophen (TYLENOL) 500 MG tablet Take 500-1,000 mg by mouth every 6 hours as needed for mild pain       albuterol (PROAIR HFA/PROVENTIL HFA/VENTOLIN HFA) 108 (90 Base) MCG/ACT inhaler Inhale 2 puffs into the lungs every 6 hours 6.7 g 3     albuterol (PROAIR HFA/PROVENTIL HFA/VENTOLIN HFA) 108 (90 Base) MCG/ACT inhaler Inhale 1-2 puffs into the lungs every 4 hours as needed for shortness of breath / dyspnea or wheezing 18 g 0     Fexofenadine HCl (ALLEGRA PO)        GuaiFENesin (MUCINEX PO)        Pseudoeph-Doxylamine-DM-APAP (NYQUIL PO)        Allergies   Allergen Reactions     Sulfa Drugs      No lab results found.     Asthma Follow-Up    Was ACT completed today?    Yes    ACT Total Scores 2019   ACT TOTAL SCORE -   ASTHMA ER VISITS -   ASTHMA HOSPITALIZATIONS -   ACT TOTAL SCORE (Goal Greater than or Equal to 20) 22   In the past 12 months, how many times did you visit the emergency room  "for your asthma without being admitted to the hospital? 0   In the past 12 months, how many times were you hospitalized overnight because of your asthma? 0       Recent asthma triggers that patient is dealing with: upper respiratory infections and allergies          Review of Systems   Constitutional: Negative for chills and fever.   HENT: Negative for congestion, ear pain, hearing loss and sore throat.    Eyes: Negative for pain and visual disturbance.   Respiratory: Negative for cough and shortness of breath.    Cardiovascular: Negative for chest pain, palpitations and peripheral edema.   Gastrointestinal: Negative for abdominal pain, constipation, diarrhea, heartburn, hematochezia and nausea.   Breasts:  Negative for tenderness, breast mass and discharge.   Genitourinary: Negative for dysuria, frequency, genital sores, hematuria, pelvic pain, urgency, vaginal bleeding and vaginal discharge.   Musculoskeletal: Negative for arthralgias, joint swelling and myalgias.   Skin: Negative for rash.   Neurological: Negative for dizziness, weakness, headaches and paresthesias.   Psychiatric/Behavioral: Negative for mood changes. The patient is not nervous/anxious.    Pt has stiffness in neck when she wakes up in the Morning  Ongoing for many years and no change  No radiation of pain  Pt has  Pain with sexual Ferrum -feels Dry      OBJECTIVE:   /70   Pulse 82   Temp 97.7  F (36.5  C) (Oral)   Resp 16   Ht 1.708 m (5' 7.25\")   Wt 72.1 kg (159 lb)   SpO2 99%   Breastfeeding? No   BMI 24.72 kg/m   Estimated body mass index is 24.72 kg/m  as calculated from the following:    Height as of this encounter: 1.708 m (5' 7.25\").    Weight as of this encounter: 72.1 kg (159 lb).  Physical Exam  GENERAL: healthy, alert and no distress  EYES: Eyes grossly normal to inspection, PERRL and conjunctivae and sclerae normal  HENT: ear canals and TM's normal, nose and mouth without ulcers or lesions  NECK: no adenopathy, no " asymmetry, masses, or scars and thyroid normal to palpation  RESP: lungs clear to auscultation - no rales, rhonchi or wheezes  BREAST: normal without masses, tenderness or nipple discharge and no palpable axillary masses or adenopathy  CV: regular rate and rhythm, normal S1 S2, no S3 or S4, no murmur, click or rub, no peripheral edema and peripheral pulses strong  ABDOMEN: soft, nontender, no hepatosplenomegaly, no masses and bowel sounds normal   (female): normal female external genitalia, normal urethral meatus, vaginal mucosa pink, moist, well rugated, and normal cervix/adnexa/uterus without masses or discharge  MS: no gross musculoskeletal defects noted, no edema  SKIN: no suspicious lesions or rashes  NEURO: Normal strength and tone, mentation intact and speech normal  PSYCH: mentation appears normal, affect normal/bright    Diagnostic Test Results:  Pending     ASSESSMENT / PLAN:   1. Encounter for routine adult physical exam with abnormal findings    - Glucose  - Vitamin D Deficiency    2. Screen for colon cancer  Advised cologiard  Pt declined colonoscopy    3. Atrophic vaginitis    - conjugated estrogens (PREMARIN) 0.625 MG/GM vaginal cream; Place 0.5 g vaginally twice a week  Dispense: 4 g; Refill: 3    4. Family history of diabetes mellitus  labs      5. Asymptomatic postmenopausal status  Advised   - DEXA HIP/PELVIS/SPINE - Future; Future    6. Visit for screening mammogram    - MA SCREENING DIGITAL BILAT - Future  (s+30); Future    7. Screening for malignant neoplasm of cervix    - Pap imaged thin layer screen with HPV - recommended age 30 - 65 years (select HPV order below)    8. Need for hepatitis C screening test    - Hepatitis C Screen Reflex to HCV RNA Quant and Genotype    9. Screening for HIV (human immunodeficiency virus)  Discussed   - HIV Screening    10. Need for prophylactic vaccination and inoculation against influenza  Advised   - FLU VACCINE, INCREASED ANTIGEN, PRESV FREE, AGE 65+  "[21637]  - Vaccine Administration, Initial [20551]    11. Need for prophylactic vaccination against Streptococcus pneumoniae (pneumococcus)    For cervical strain advised PT  Follow up if not better  Tylenol  12-Asthma is stable   Refills done    End of Life Planning:  Patient currently has an advanced directive: No.  I have verified the patient's ablity to prepare an advanced directive/make health care decisions.  Literature was provided to assist patient in preparing an advanced directive.    COUNSELING:  Reviewed preventive health counseling, as reflected in patient instructions       Regular exercise       Healthy diet/nutrition       Vision screening       Hearing screening       Dental care       Bladder control       Fall risk prevention       Immunizations    Vaccinated for: Influenza and Pneumococcal             Osteoporosis Prevention/Bone Health       Colon cancer screening       Hepatitis C screening       HIV screening for high risk patient       The ASCVD Risk score (Daniela LYNCH Jr., et al., 2013) failed to calculate for the following reasons:    Cannot find a previous HDL lab    Cannot find a previous total cholesterol lab       Advanced Planning     BP Readings from Last 1 Encounters:   04/08/19 138/70     Estimated body mass index is 24.72 kg/m  as calculated from the following:    Height as of this encounter: 1.708 m (5' 7.25\").    Weight as of this encounter: 72.1 kg (159 lb).           reports that she has never smoked. She has never used smokeless tobacco.      Appropriate preventive services were discussed with this patient, including applicable screening as appropriate for cardiovascular disease, diabetes, osteopenia/osteoporosis, and glaucoma.  As appropriate for age/gender, discussed screening for colorectal cancer, prostate cancer, breast cancer, and cervical cancer. Checklist reviewing preventive services available has been given to the patient.    Reviewed patients plan of care and provided " an AVS. The Intermediate Care Plan ( asthma action plan, low back pain action plan, and migraine action plan) for Christ meets the Care Plan requirement. This Care Plan has been established and reviewed with the Patient.    Counseling Resources:  ATP IV Guidelines  Pooled Cohorts Equation Calculator  Breast Cancer Risk Calculator  FRAX Risk Assessment  ICSI Preventive Guidelines  Dietary Guidelines for Americans, 2010  [employer]'s MyPlate  ASA Prophylaxis  Lung CA Screening    Latanya Day MD  [facility]    Identified Health Risks:

## 2019-04-08 NOTE — PATIENT INSTRUCTIONS
Patient Education   Personalized Prevention Plan  You are due for the preventive services outlined below.  Your care team is available to assist you in scheduling these services.  If you have already completed any of these items, please share that information with your care team to update in your medical record.  Health Maintenance Due   Topic Date Due     HIV SCREEN (SYSTEM ASSIGNED)  03/12/1972     Hepatitis C Screening  03/12/1972     Zoster (Shingles) Vaccine (1 of 2) 03/12/2004     Mammogram - every 2 years  12/09/2012     Pap Smear - every 3 years  10/22/2013     Cholesterol Lab - every 5 years  10/22/2015     Asthma Action Plan - yearly  10/24/2017     Colon Cancer Screening - FIT Test - yearly  11/06/2017     Flu Vaccine (1) 09/01/2018     Pneumococcal Vaccine (1 of 2 - PCV13) 03/12/2019     Annual Wellness Visit  03/12/2019     FALL RISK ASSESSMENT  03/12/2019     Bone Density Screening (Dexa)  03/12/2019     Asthma Control Test - every 6 months  03/24/2019           Please get Shingles shot  Please schedule Bone Density test and mammogram  Please do Cologuaheather Day MD

## 2019-04-09 LAB
CHOLEST SERPL-MCNC: 166 MG/DL
HCV AB SERPL QL IA: NONREACTIVE
HDLC SERPL-MCNC: 51 MG/DL
LDLC SERPL CALC-MCNC: 89 MG/DL
NONHDLC SERPL-MCNC: 115 MG/DL
TRIGL SERPL-MCNC: 130 MG/DL

## 2019-04-09 ASSESSMENT — ASTHMA QUESTIONNAIRES: ACT_TOTALSCORE: 22

## 2019-04-10 LAB
COPATH REPORT: NORMAL
PAP: NORMAL

## 2019-04-12 LAB
FINAL DIAGNOSIS: NORMAL
HPV HR 12 DNA CVX QL NAA+PROBE: NEGATIVE
HPV16 DNA SPEC QL NAA+PROBE: NEGATIVE
HPV18 DNA SPEC QL NAA+PROBE: NEGATIVE
SPECIMEN DESCRIPTION: NORMAL
SPECIMEN SOURCE CVX/VAG CYTO: NORMAL

## 2019-04-15 ENCOUNTER — ANCILLARY PROCEDURE (OUTPATIENT)
Dept: MAMMOGRAPHY | Facility: CLINIC | Age: 64
End: 2019-04-15
Payer: COMMERCIAL

## 2019-04-15 ENCOUNTER — ANCILLARY PROCEDURE (OUTPATIENT)
Dept: BONE DENSITY | Facility: CLINIC | Age: 64
End: 2019-04-15
Attending: FAMILY MEDICINE
Payer: COMMERCIAL

## 2019-04-15 DIAGNOSIS — Z12.31 VISIT FOR SCREENING MAMMOGRAM: ICD-10-CM

## 2019-04-15 DIAGNOSIS — Z78.0 ASYMPTOMATIC POSTMENOPAUSAL STATUS: ICD-10-CM

## 2019-04-15 PROCEDURE — 77080 DXA BONE DENSITY AXIAL: CPT | Performed by: INTERNAL MEDICINE

## 2019-04-15 PROCEDURE — 77067 SCR MAMMO BI INCL CAD: CPT

## 2019-04-28 ENCOUNTER — TRANSFERRED RECORDS (OUTPATIENT)
Dept: HEALTH INFORMATION MANAGEMENT | Facility: CLINIC | Age: 64
End: 2019-04-28

## 2019-04-28 LAB — COLOGUARD-ABSTRACT: NEGATIVE

## 2019-05-09 ENCOUNTER — TELEPHONE (OUTPATIENT)
Dept: FAMILY MEDICINE | Facility: CLINIC | Age: 64
End: 2019-05-09

## 2019-05-09 NOTE — TELEPHONE ENCOUNTER
Results have been received from [employer] for     The Cologuard Results Negative     Results date 4/28/2019    Results have been placed in provider basket- provider to review and sign off on results.  Please send back to team with OK to send result letter and any additional follow-up needed.     Ellie Breaux,       sent copy of results to scanning.

## 2020-02-23 ENCOUNTER — HEALTH MAINTENANCE LETTER (OUTPATIENT)
Age: 65
End: 2020-02-23

## 2020-06-10 RX ORDER — ALBUTEROL SULFATE 90 UG/1
AEROSOL, METERED RESPIRATORY (INHALATION)
Qty: 18 INHALER | Refills: 3 | OUTPATIENT
Start: 2020-06-10

## 2020-06-10 NOTE — PROGRESS NOTES
"Christ Ca is a 65 year old female who is being evaluated via a billable video visit.      The patient has been notified of following:     \"This video visit will be conducted via a call between you and your physician/provider. We have found that certain health care needs can be provided without the need for an in-person physical exam.  This service lets us provide the care you need with a video conversation.  If a prescription is necessary we can send it directly to your pharmacy.  If lab work is needed we can place an order for that and you can then stop by our lab to have the test done at a later time.    Video visits are billed at different rates depending on your insurance coverage.  Please reach out to your insurance provider with any questions.    If during the course of the call the physician/provider feels a video visit is not appropriate, you will not be charged for this service.\"    Patient has given verbal consent for Video visit? Yes    How would you like to [facility] your [facility]? [facility]    Patient would like the video invitation sent by: Text to cell phone: [phone]    Will anyone else be joining your video visit? No    Subjective     Christ Ca is a 65 year old female who presents today via video visit for the following health issues:    HPI  Asthma Follow-Up    Was ACT completed today?    Yes    ACT Total Scores 6/10/2020   ACT TOTAL SCORE -   ASTHMA ER VISITS -   ASTHMA HOSPITALIZATIONS -   ACT TOTAL SCORE (Goal Greater than or Equal to 20) 23   In the past 12 months, how many times did you visit the emergency room for your asthma without being admitted to the hospital? 0   In the past 12 months, how many times were you hospitalized overnight because of your asthma? 0         How many days per week do you miss taking your asthma controller medication?  I do not have an asthma controller medication    Please describe any recent triggers for your asthma: pollens    Have you had any Emergency " Room Visits, Urgent Care Visits, or Hospital Admissions since your last office visit?  No      How many servings of fruits and vegetables do you eat daily?  4 or more    On average, how many sweetened beverages do you drink each day (Examples: soda, juice, sweet tea, etc.  Do NOT count diet or artificially sweetened beverages)?   0    How many days per week do you exercise enough to make your heart beat faster? 7    How many minutes a day do you exercise enough to make your heart beat faster? 30 - 60    How many days per week do you miss taking your medication? 0        Video Start Time: 11.22 AM     Patient Active Problem List   Diagnosis     Chronic rhinitis     Intermittent asthma     CARDIOVASCULAR SCREENING; LDL GOAL LESS THAN 160     Chronic cough     Advanced directives, counseling/discussion     Atrophic vaginitis     Past Surgical History:   Procedure Laterality Date     ABDOMEN SURGERY  3/31/79    Cesaerean     C/SECTION, LOW TRANSVERSE      , Low Transverse     COLONOSCOPY       SURGICAL HISTORY OF -   age 9    Eye     TONSILLECTOMY         Social History     Tobacco Use     Smoking status: Never Smoker     Smokeless tobacco: Never Used     Tobacco comment: Both Parents smoked   Substance Use Topics     Alcohol use: Yes     Alcohol/week: 0.0 standard drinks     Comment: One glass once a week or less     Family History   Problem Relation Age of Onset     Cancer - colorectal Mother         age 78     Cancer Mother         Lung ca age 72     Heart Disease Mother         age 72     Coronary Artery Disease Mother      Hypertension Mother      Hyperlipidemia Mother      Colon Cancer Mother      Alcohol/Drug Father      Alzheimer Disease Father      Depression Brother         1      Depression Sister         has Depression,1 sister has Bipolar     Heart Disease Brother         age 40     Substance Abuse Sister          Current Outpatient Medications   Medication Sig Dispense Refill      "acetaminophen (TYLENOL) 500 MG tablet Take 500-1,000 mg by mouth every 6 hours as needed for mild pain       albuterol (PROAIR HFA/PROVENTIL HFA/VENTOLIN HFA) 108 (90 Base) MCG/ACT inhaler Inhale 1-2 puffs into the lungs every 4 hours as needed for shortness of breath / dyspnea or wheezing 18 g 11     albuterol (PROAIR HFA/PROVENTIL HFA/VENTOLIN HFA) 108 (90 Base) MCG/ACT inhaler Inhale 2 puffs into the lungs every 6 hours 6.7 g 3     Fexofenadine HCl (ALLEGRA PO)        GuaiFENesin (MUCINEX PO)        Pseudoeph-Doxylamine-DM-APAP (NYQUIL PO)        Allergies   Allergen Reactions     Sulfa Drugs      Recent Labs   Lab Test 04/08/19  1000   LDL 89   HDL 51   TRIG 130      BP Readings from Last 3 Encounters:   04/08/19 138/70   03/19/19 144/74   09/24/18 122/82    Wt Readings from Last 3 Encounters:   04/08/19 72.1 kg (159 lb)   03/19/19 74.4 kg (164 lb)   09/24/18 77.1 kg (170 lb)                    Reviewed and updated as needed this visit by Provider  Tobacco  Allergies  Meds  Problems  Med Hx  Surg Hx  Fam Hx         Review of Systems   CONSTITUTIONAL: NEGATIVE for fever, chills, change in weight  ENT/MOUTH: NEGATIVE for ear, mouth and throat problems  RESP: NEGATIVE for significant cough or SOB  CV: NEGATIVE for chest pain, palpitations or peripheral edema  NEURO: NEGATIVE for weakness, dizziness or paresthesias      Objective    There were no vitals taken for this visit.  Estimated body mass index is 24.72 kg/m  as calculated from the following:    Height as of 4/8/19: 1.708 m (5' 7.25\").    Weight as of 4/8/19: 72.1 kg (159 lb).  Physical Exam     GENERAL: Healthy, alert and no distress  EYES: Eyes grossly normal to inspection.  No discharge or erythema, or obvious scleral/conjunctival abnormalities.  RESP: No audible wheeze, cough, or visible cyanosis.  No visible retractions or increased work of breathing.    SKIN: Visible skin clear. No significant rash, abnormal pigmentation or lesions.  NEURO: " Cranial nerves grossly intact.  Mentation and speech appropriate for age.  PSYCH: Mentation appears normal, affect normal/bright, judgement and insight intact, normal speech and appearance well-groomed.      Diagnostic Test Results:  Labs reviewed in Epic        Assessment & Plan     1. Mild intermittent asthma without complication  controlled    2. Chronic rhinitis  Stable   Takes allegra      Return in about 3 months (around 9/16/2020) for Physical Exam.    Latanya Day MD  [facility]      Video-Visit Details    Type of service:  Video Visit    Video End Time:11:27AM    Originating Location (pt. Location): Home    Distant Location (provider location):  [facility]     Platform used for Video Visit: Ralph    Return in about 3 months (around 9/16/2020) for Physical Exam.   Visit ended 11.27 AM  Latanya Dya MD

## 2020-06-10 NOTE — TELEPHONE ENCOUNTER
Pt is due for visit for refill, please notify (can be video or telephone--Shay prefers video if possible).

## 2020-06-11 ASSESSMENT — ASTHMA QUESTIONNAIRES: ACT_TOTALSCORE: 23

## 2020-06-16 ENCOUNTER — VIRTUAL VISIT (OUTPATIENT)
Dept: FAMILY MEDICINE | Facility: CLINIC | Age: 65
End: 2020-06-16
Payer: COMMERCIAL

## 2020-06-16 DIAGNOSIS — J45.20 MILD INTERMITTENT ASTHMA WITHOUT COMPLICATION: Primary | ICD-10-CM

## 2020-06-16 DIAGNOSIS — R09.1 PLEURISY: ICD-10-CM

## 2020-06-16 DIAGNOSIS — J31.0 CHRONIC RHINITIS: ICD-10-CM

## 2020-06-16 PROCEDURE — 99213 OFFICE O/P EST LOW 20 MIN: CPT | Mod: GT | Performed by: FAMILY MEDICINE

## 2020-06-16 RX ORDER — ALBUTEROL SULFATE 90 UG/1
1-2 AEROSOL, METERED RESPIRATORY (INHALATION) EVERY 4 HOURS PRN
Qty: 18 G | Refills: 11 | Status: SHIPPED | OUTPATIENT
Start: 2020-06-16 | End: 2021-06-18

## 2020-06-16 RX ORDER — ALBUTEROL SULFATE 90 UG/1
1-2 AEROSOL, METERED RESPIRATORY (INHALATION) EVERY 4 HOURS PRN
Qty: 18 G | Refills: 0 | Status: SHIPPED | OUTPATIENT
Start: 2020-06-16 | End: 2020-06-16

## 2020-12-13 ENCOUNTER — HEALTH MAINTENANCE LETTER (OUTPATIENT)
Age: 65
End: 2020-12-13

## 2021-06-17 DIAGNOSIS — R09.1 PLEURISY: ICD-10-CM

## 2021-06-18 RX ORDER — ALBUTEROL SULFATE 90 UG/1
AEROSOL, METERED RESPIRATORY (INHALATION)
Qty: 18 G | Refills: 0 | Status: SHIPPED | OUTPATIENT
Start: 2021-06-18 | End: 2021-09-09

## 2021-06-18 NOTE — TELEPHONE ENCOUNTER
Routing refill request to provider for review/approval because:  ACT  Appointment needed    ACT Total Scores 9/24/2018 4/8/2019 6/10/2020   ACT TOTAL SCORE - - -   ASTHMA ER VISITS - - -   ASTHMA HOSPITALIZATIONS - - -   ACT TOTAL SCORE (Goal Greater than or Equal to 20) 23 22 23   In the past 12 months, how many times did you visit the emergency room for your asthma without being admitted to the hospital? 0 0 0   In the past 12 months, how many times were you hospitalized overnight because of your asthma? 0 0 0             Pending Prescriptions:                       Disp   Refills    albuterol (PROAIR HFA/PROVENTIL HFA/VENTOL*       11       Sig: INHALE 1-2 PUFFS EVERY 4 HRS AS NEEDED        Dmitry Bowen RN

## 2021-08-01 ENCOUNTER — HEALTH MAINTENANCE LETTER (OUTPATIENT)
Age: 66
End: 2021-08-01

## 2021-08-19 NOTE — LETTER
May 31, 2019      Christ Ca  [id number] [address] [facility] [id number]      Dear Christ,    The result of your recent Cologuard testing was negative. A negative result means that Cologuard did not detect significant levels of DNA and/or hemoglobin biomarkers in the stool which are associated with colon cancer or precancer.  Thank you for completing your screening, your next screening should be completed in 3 years.  If you have any questions or concerns, please contact your care team at [phone].     Sincerely,      Latanya Day MD/dt                   Not applicable

## 2021-09-09 ENCOUNTER — MYC REFILL (OUTPATIENT)
Dept: FAMILY MEDICINE | Facility: CLINIC | Age: 66
End: 2021-09-09

## 2021-09-09 DIAGNOSIS — R09.1 PLEURISY: ICD-10-CM

## 2021-09-10 ENCOUNTER — OFFICE VISIT (OUTPATIENT)
Dept: FAMILY MEDICINE | Facility: CLINIC | Age: 66
End: 2021-09-10
Payer: COMMERCIAL

## 2021-09-10 VITALS
OXYGEN SATURATION: 97 % | TEMPERATURE: 97.4 F | SYSTOLIC BLOOD PRESSURE: 130 MMHG | HEART RATE: 87 BPM | DIASTOLIC BLOOD PRESSURE: 72 MMHG

## 2021-09-10 DIAGNOSIS — J40 BRONCHITIS: Primary | ICD-10-CM

## 2021-09-10 DIAGNOSIS — J45.901 MODERATE ASTHMA WITH ACUTE EXACERBATION, UNSPECIFIED WHETHER PERSISTENT: ICD-10-CM

## 2021-09-10 PROCEDURE — U0003 INFECTIOUS AGENT DETECTION BY NUCLEIC ACID (DNA OR RNA); SEVERE ACUTE RESPIRATORY SYNDROME CORONAVIRUS 2 (SARS-COV-2) (CORONAVIRUS DISEASE [COVID-19]), AMPLIFIED PROBE TECHNIQUE, MAKING USE OF HIGH THROUGHPUT TECHNOLOGIES AS DESCRIBED BY CMS-2020-01-R: HCPCS | Performed by: FAMILY MEDICINE

## 2021-09-10 PROCEDURE — 99214 OFFICE O/P EST MOD 30 MIN: CPT | Performed by: FAMILY MEDICINE

## 2021-09-10 PROCEDURE — U0005 INFEC AGEN DETEC AMPLI PROBE: HCPCS | Performed by: FAMILY MEDICINE

## 2021-09-10 RX ORDER — CETIRIZINE HYDROCHLORIDE 10 MG/1
10 TABLET ORAL DAILY
Qty: 5 TABLET | Refills: 0 | Status: SHIPPED | OUTPATIENT
Start: 2021-09-10 | End: 2023-05-18

## 2021-09-10 RX ORDER — AZITHROMYCIN 250 MG/1
TABLET, FILM COATED ORAL
Qty: 6 TABLET | Refills: 0 | Status: SHIPPED | OUTPATIENT
Start: 2021-09-10 | End: 2021-09-15

## 2021-09-10 NOTE — PROGRESS NOTES
Assessment & Plan   Patient is a 66-year-old female with history of asthma presented to the clinic for concern of multiple symptoms including nasal congestion, rhinorrhea, wheezing, fatigue, decreased appetite.  No fever or chills.  Exam showed diffuse rhonchi and wheezing.  Discussed differential diagnosis and treatment option with the patient.  I suspect bronchitis versus asthma exacerbation.  Will treat with antibiotics including Zithromax and Augmentin.  Advised her to use albuterol inhaler every 4 hours and I added Atrovent inhaler as well.  Symptomatic treatment with Zyrtec.  Check her for Covid 19.  This patient was evaluated during a global COVID-19 pandemic, which necessitated consideration that the patient might be at risk for infection with the SARS-CoV-2 virus that causes COVID-19  Patient verbalized understanding and agreed on the plan of care.  All questions answered.  Bronchitis    - azithromycin (ZITHROMAX) 250 MG tablet; Take 2 tablets (500 mg) by mouth daily for 1 day, THEN 1 tablet (250 mg) daily for 4 days.  - amoxicillin-clavulanate (AUGMENTIN) 875-125 MG tablet; Take 1 tablet by mouth 2 times daily for 7 days  - Symptomatic COVID-19 Virus (Coronavirus) by PCR; Future  - ipratropium (ATROVENT HFA) 17 MCG/ACT inhaler; Inhale 2 puffs into the lungs every 6 hours  - cetirizine (ZYRTEC) 10 MG tablet; Take 1 tablet (10 mg) by mouth daily  - Symptomatic COVID-19 Virus (Coronavirus) by PCR Nose    Moderate asthma with acute exacerbation, unspecified whether persistent  - azithromycin (ZITHROMAX) 250 MG tablet; Take 2 tablets (500 mg) by mouth daily for 1 day, THEN 1 tablet (250 mg) daily for 4 days.  - amoxicillin-clavulanate (AUGMENTIN) 875-125 MG tablet; Take 1 tablet by mouth 2 times daily for 7 days  - Symptomatic COVID-19 Virus (Coronavirus) by PCR; Future  - ipratropium (ATROVENT HFA) 17 MCG/ACT inhaler; Inhale 2 puffs into the lungs every 6 hours  - cetirizine (ZYRTEC) 10 MG tablet; Take 1  tablet (10 mg) by mouth daily  - Symptomatic COVID-19 Virus (Coronavirus) by PCR Nose                 Return in about 1 week (around 9/17/2021) for Follow up, in person.    Enriqueta Bravo MD  [facility]    Chayo Polo is a 66 year old who presents for the following health issues     HPI     Acute Illness  Acute illness concerns:   Onset/Duration: 1 week  Symptoms:  Fever: no  Chills/Sweats: no  Headache (location?): YES  Sinus Pressure: no  Conjunctivitis:  no  Ear Pain: no  Rhinorrhea: YES  Congestion: YES  Sore Throat: no  Cough: YES  Wheeze: YES  Decreased Appetite: YES  Nausea: no  Vomiting: no  Diarrhea: no  Dysuria/Freq.: no  Dysuria or Hematuria: no  Fatigue/Achiness: YES- little fatigue  Sick/Strep Exposure: YES  Therapies tried and outcome: mucinex, IBU, night quil      Review of Systems   Constitutional, HEENT, cardiovascular, pulmonary, gi and gu systems are negative, except as otherwise noted.      Objective    /72   Pulse 87   Temp 97.4  F (36.3  C) (Tympanic)   SpO2 97%   There is no height or weight on file to calculate BMI.  Physical Exam  Vitals and nursing note reviewed.   Constitutional:       General: She is not in acute distress.     Appearance: Normal appearance. She is not ill-appearing, toxic-appearing or diaphoretic.   Cardiovascular:      Rate and Rhythm: Normal rate and regular rhythm.      Heart sounds: No murmur heard.   No friction rub. No gallop.    Pulmonary:      Effort: No respiratory distress.      Breath sounds: No stridor. Wheezing and rhonchi present. No rales.   Chest:      Chest wall: No tenderness.   Abdominal:      General: Abdomen is flat.   Skin:     Capillary Refill: Capillary refill takes less than 2 seconds.   Neurological:      General: No focal deficit present.      Mental Status: She is alert.

## 2021-09-11 LAB — SARS-COV-2 RNA RESP QL NAA+PROBE: NEGATIVE

## 2021-09-13 RX ORDER — ALBUTEROL SULFATE 90 UG/1
2 AEROSOL, METERED RESPIRATORY (INHALATION) EVERY 4 HOURS PRN
Qty: 18 G | Refills: 0 | Status: SHIPPED | OUTPATIENT
Start: 2021-09-13 | End: 2022-11-17

## 2021-09-13 NOTE — TELEPHONE ENCOUNTER
Routing refill request to provider for review/approval because:  ACT    Araceli Craig RN  [facility]

## 2021-09-20 ENCOUNTER — VIRTUAL VISIT (OUTPATIENT)
Dept: FAMILY MEDICINE | Facility: CLINIC | Age: 66
End: 2021-09-20
Payer: COMMERCIAL

## 2021-09-20 DIAGNOSIS — B37.0 THRUSH: Primary | ICD-10-CM

## 2021-09-20 DIAGNOSIS — N89.8 VAGINAL IRRITATION: ICD-10-CM

## 2021-09-20 DIAGNOSIS — J40 BRONCHITIS: ICD-10-CM

## 2021-09-20 PROCEDURE — 99213 OFFICE O/P EST LOW 20 MIN: CPT | Mod: 95 | Performed by: PHYSICIAN ASSISTANT

## 2021-09-20 RX ORDER — FLUCONAZOLE 150 MG/1
150 TABLET ORAL ONCE
Qty: 1 TABLET | Refills: 0 | Status: SHIPPED | OUTPATIENT
Start: 2021-09-20 | End: 2021-09-20

## 2021-09-20 RX ORDER — NYSTATIN 100000/ML
500000 SUSPENSION, ORAL (FINAL DOSE FORM) ORAL 4 TIMES DAILY
Qty: 100 ML | Refills: 0 | Status: SHIPPED | OUTPATIENT
Start: 2021-09-20 | End: 2021-09-25

## 2021-09-20 NOTE — PROGRESS NOTES
Christ is a 66 year old who is being evaluated via a billable video visit.      How would you like to obtain your AVS? MyChart  If the video visit is dropped, the invitation should be resent by: Text to cell phone: [phone]   Will anyone else be joining your video visit? No      Video Start Time: 11:49 AM    Assessment & Plan     Thrush  Follow up in clinic if not improving   - fluconazole (DIFLUCAN) 150 MG tablet; Take 1 tablet (150 mg) by mouth once for 1 dose  - nystatin (MYCOSTATIN) 149080 UNIT/ML suspension; Take 5 mLs (500,000 Units) by mouth 4 times daily for 5 days Swish and spit or swallow    Vaginal irritation  Likely from yeast.  As above    Bronchitis  Above due to antibiotics she has been on for this.  Continue to treat cough and pain symptomatically but if not improving consider xray and follow up in clinic.                     Return in about 1 week (around 9/27/2021) for if not improving.    Arabella Stark PA-C  [facility]   Christ is a 66 year old who presents for the following health issues     HPI     White lesions-poss thrush.Patient finished taking antibiotic 2 days ago   Also using inhaler more.  Was sore yesterday but not so much today   Only in mouth   No hx of diabetes.    Also has some vaginal discomfort like yeast infection   Still has a cough and some lung pain.  No xray was done last office visit.  Ibuprofen helps               Review of Systems   As above      Objective           Vitals:  No vitals were obtained today due to virtual visit.    Physical Exam   GENERAL: Healthy, alert and no distress  EYES: Eyes grossly normal to inspection.  No discharge or erythema, or obvious scleral/conjunctival abnormalities.  HENT: can see one white spot in bach of throat but due to camera I am unable to visualize the spots on the roof of her mouth   RESP: lungs clear to auscultation - no rales, rhonchi or wheezes and occasional cough noted  SKIN: Visible  skin clear. No significant rash, abnormal pigmentation or lesions.  NEURO: Cranial nerves grossly intact.  Mentation and speech appropriate for age.  PSYCH: Mentation appears normal, affect normal/bright, judgement and insight intact, normal speech and appearance well-groomed.                Video-Visit Details    Type of service:  Video Visit    Video End Time:11:57 AM    Originating Location (pt. Location): Home    Distant Location (provider location):  [facility]     Platform used for Video Visit: 3Scan

## 2021-09-21 ASSESSMENT — ASTHMA QUESTIONNAIRES: ACT_TOTALSCORE: 15

## 2021-09-26 ENCOUNTER — HEALTH MAINTENANCE LETTER (OUTPATIENT)
Age: 66
End: 2021-09-26

## 2022-01-16 ENCOUNTER — HEALTH MAINTENANCE LETTER (OUTPATIENT)
Age: 67
End: 2022-01-16

## 2022-05-23 ENCOUNTER — TELEPHONE (OUTPATIENT)
Dept: FAMILY MEDICINE | Facility: CLINIC | Age: 67
End: 2022-05-23

## 2022-05-23 NOTE — TELEPHONE ENCOUNTER
Patient Quality Outreach    Patient is due for the following:   Asthma  -  ACT needed  Colon Cancer Screening -  Cologuard  Breast Cancer Screening - Mammogram  Physical  - overdue since 4/8/2020    NEXT STEPS:   Schedule a yearly physical and complete ACT sent via [employer]    Type of outreach:    Sent "[employer] message. with ACT    Next Steps:  Reach out within 90 days via Letter.    Max number of attempts reached: No. Will try again in 90 days if patient still on fail list.    Questions for provider review:    None     Stefany Zhu MA

## 2022-08-31 ENCOUNTER — PATIENT OUTREACH (OUTPATIENT)
Dept: FAMILY MEDICINE | Facility: CLINIC | Age: 67
End: 2022-08-31

## 2022-08-31 NOTE — LETTER
August 31, 2022      Christ [place]  [address] [address]      Your healthcare team cares about your health. To provide you with the best care,   we have reviewed your chart and based on our findings, we see that you are due to:     - BREAST CANCER SCREENING:  Schedule Annual Mammogram. Breast center scheduling number - [phone] or schedule in MyChart (self referall)  - OTHER FOLLOW UP:  colonoscopy,asthma and physical     If you have already completed these items, please contact the clinic via phone or   [employer] so your care team can review and update your records. Thank you for   choosing [facility] for your healthcare needs. For any questions,   concerns, or to schedule an appointment please contact the clinic.       Healthy Regards,      Your [facility] Care Team

## 2022-08-31 NOTE — LETTER
September 9, 2022      Christ Ca  [address] [address] [address]      Your healthcare team cares about your health. To provide you with the best care,   we have reviewed your chart and based on our findings, we see that you are due to:     - BREAST CANCER SCREENING:  Schedule Annual Mammogram. Breast [place] scheduling number - [phone] or schedule in MyChart (self referall)  - COLON CANCER SCREENING:  Call or mychart the clinic to schedule your colonoscopy or schedule/ your FIT Test, or Cologuard test  - OTHER FOLLOW UP:  wellness     If you have already completed these items, please contact the clinic via phone or   [employer] so your care team can review and update your records. Thank you for   choosing [facility] for your healthcare needs. For any questions,   concerns, or to schedule an appointment please contact the clinic.       Healthy Regards,      Your [facility] Care Team

## 2022-08-31 NOTE — TELEPHONE ENCOUNTER
Patient Quality Outreach    Patient is due for the following:   Asthma  -  ACT needed  Colon Cancer Screening  Breast Cancer Screening - Mammogram  Physical Annual Wellness Visit      Topic Date Due     Pneumococcal Vaccine (2 - PPSV23 or PCV20) 04/08/2020     Diptheria Tetanus Pertussis (DTAP/TDAP/TD) Vaccine (3 - Td or Tdap) 10/22/2020     COVID-19 Vaccine (4 - Booster for Moderna series) 03/19/2022     Flu Vaccine (1) 09/01/2022       Next Steps:   Schedule a Annual Wellness Visit    Type of outreach:    Sent letter.    Next Steps:  Reach out within 90 days via Letter.    Max number of attempts reached: No. Will try again in 90 days if patient still on fail list.    Questions for provider review:    None     Kristen Castro, CMA

## 2022-09-09 NOTE — TELEPHONE ENCOUNTER
Patient Quality Outreach    Patient is due for the following:   Asthma  -  ACT needed  Colon Cancer Screening  Breast Cancer Screening - Mammogram    Next Steps: needs wellness   Type of outreach:    Sent letter.    Next Steps:  Reach out within 90 days via Letter.    Max number of attempts reached: Yes. Will try again in 90 days if patient still on fail list.    Questions for provider review:    None     Kristen Castro, [facility]

## 2022-11-15 DIAGNOSIS — R09.1 PLEURISY: ICD-10-CM

## 2022-11-15 RX ORDER — ALBUTEROL SULFATE 90 UG/1
2 AEROSOL, METERED RESPIRATORY (INHALATION) EVERY 4 HOURS PRN
OUTPATIENT
Start: 2022-11-15

## 2022-11-17 RX ORDER — ALBUTEROL SULFATE 90 UG/1
2 AEROSOL, METERED RESPIRATORY (INHALATION) EVERY 4 HOURS PRN
Qty: 18 G | Refills: 0 | Status: SHIPPED | OUTPATIENT
Start: 2022-11-17 | End: 2023-05-18

## 2022-11-17 NOTE — TELEPHONE ENCOUNTER
Patient is scheduled with Dr. Day on 2/13/2022.   That is the soonest she could get in.     Ayala Pate MA

## 2023-01-08 ENCOUNTER — HEALTH MAINTENANCE LETTER (OUTPATIENT)
Age: 68
End: 2023-01-08

## 2023-01-26 ENCOUNTER — ANCILLARY ORDERS (OUTPATIENT)
Dept: MAMMOGRAPHY | Facility: CLINIC | Age: 68
End: 2023-01-26

## 2023-01-26 ENCOUNTER — ANCILLARY PROCEDURE (OUTPATIENT)
Dept: MAMMOGRAPHY | Facility: CLINIC | Age: 68
End: 2023-01-26
Attending: FAMILY MEDICINE
Payer: COMMERCIAL

## 2023-01-26 DIAGNOSIS — Z12.31 VISIT FOR SCREENING MAMMOGRAM: ICD-10-CM

## 2023-01-26 PROCEDURE — 77067 SCR MAMMO BI INCL CAD: CPT | Mod: TC | Performed by: RADIOLOGY

## 2023-01-26 PROCEDURE — 77063 BREAST TOMOSYNTHESIS BI: CPT | Mod: TC | Performed by: RADIOLOGY

## 2023-04-23 ENCOUNTER — HEALTH MAINTENANCE LETTER (OUTPATIENT)
Age: 68
End: 2023-04-23

## 2023-05-15 ASSESSMENT — ASTHMA QUESTIONNAIRES
QUESTION_2 LAST FOUR WEEKS HOW OFTEN HAVE YOU HAD SHORTNESS OF BREATH: NOT AT ALL
QUESTION_4 LAST FOUR WEEKS HOW OFTEN HAVE YOU USED YOUR RESCUE INHALER OR NEBULIZER MEDICATION (SUCH AS ALBUTEROL): ONCE A WEEK OR LESS
QUESTION_1 LAST FOUR WEEKS HOW MUCH OF THE TIME DID YOUR ASTHMA KEEP YOU FROM GETTING AS MUCH DONE AT WORK, SCHOOL OR AT HOME: NONE OF THE TIME
QUESTION_5 LAST FOUR WEEKS HOW WOULD YOU RATE YOUR ASTHMA CONTROL: WELL CONTROLLED
ACT_TOTALSCORE: 23
QUESTION_3 LAST FOUR WEEKS HOW OFTEN DID YOUR ASTHMA SYMPTOMS (WHEEZING, COUGHING, SHORTNESS OF BREATH, CHEST TIGHTNESS OR PAIN) WAKE YOU UP AT NIGHT OR EARLIER THAN USUAL IN THE MORNING: NOT AT ALL

## 2023-05-18 ENCOUNTER — OFFICE VISIT (OUTPATIENT)
Dept: FAMILY MEDICINE | Facility: CLINIC | Age: 68
End: 2023-05-18
Payer: COMMERCIAL

## 2023-05-18 VITALS
WEIGHT: 178.4 LBS | SYSTOLIC BLOOD PRESSURE: 135 MMHG | OXYGEN SATURATION: 98 % | TEMPERATURE: 97.6 F | HEART RATE: 78 BPM | DIASTOLIC BLOOD PRESSURE: 88 MMHG | HEIGHT: 67 IN | BODY MASS INDEX: 28 KG/M2

## 2023-05-18 DIAGNOSIS — M85.80 OSTEOPENIA, UNSPECIFIED LOCATION: ICD-10-CM

## 2023-05-18 DIAGNOSIS — J45.20 MILD INTERMITTENT ASTHMA WITHOUT COMPLICATION: ICD-10-CM

## 2023-05-18 DIAGNOSIS — Z12.11 SCREEN FOR COLON CANCER: ICD-10-CM

## 2023-05-18 DIAGNOSIS — Z23 NEED FOR DIPHTHERIA-TETANUS-PERTUSSIS (TDAP) VACCINE: ICD-10-CM

## 2023-05-18 DIAGNOSIS — Z80.0 FAMILY HISTORY OF COLON CANCER: ICD-10-CM

## 2023-05-18 DIAGNOSIS — Z78.0 ASYMPTOMATIC POSTMENOPAUSAL STATUS: ICD-10-CM

## 2023-05-18 DIAGNOSIS — Z00.00 ENCOUNTER FOR MEDICARE ANNUAL WELLNESS EXAM: ICD-10-CM

## 2023-05-18 DIAGNOSIS — J31.0 CHRONIC RHINITIS: ICD-10-CM

## 2023-05-18 LAB
CALCIUM SERPL-MCNC: 10 MG/DL (ref 8.8–10.2)
CHOLEST SERPL-MCNC: 196 MG/DL
FASTING STATUS PATIENT QL REPORTED: ABNORMAL
GLUCOSE SERPL-MCNC: 103 MG/DL (ref 70–99)
HDLC SERPL-MCNC: 49 MG/DL
LDLC SERPL CALC-MCNC: 130 MG/DL
NONHDLC SERPL-MCNC: 147 MG/DL
PHOSPHATE SERPL-MCNC: 3.2 MG/DL (ref 2.5–4.5)
PTH-INTACT SERPL-MCNC: 36 PG/ML (ref 15–65)
TRIGL SERPL-MCNC: 87 MG/DL
TSH SERPL DL<=0.005 MIU/L-ACNC: 3 UIU/ML (ref 0.3–4.2)

## 2023-05-18 PROCEDURE — 36415 COLL VENOUS BLD VENIPUNCTURE: CPT | Performed by: FAMILY MEDICINE

## 2023-05-18 PROCEDURE — 84443 ASSAY THYROID STIM HORMONE: CPT | Performed by: FAMILY MEDICINE

## 2023-05-18 PROCEDURE — 84100 ASSAY OF PHOSPHORUS: CPT | Performed by: FAMILY MEDICINE

## 2023-05-18 PROCEDURE — 83970 ASSAY OF PARATHORMONE: CPT | Performed by: FAMILY MEDICINE

## 2023-05-18 PROCEDURE — 80061 LIPID PANEL: CPT | Performed by: FAMILY MEDICINE

## 2023-05-18 PROCEDURE — 82310 ASSAY OF CALCIUM: CPT | Performed by: FAMILY MEDICINE

## 2023-05-18 PROCEDURE — G0438 PPPS, INITIAL VISIT: HCPCS | Performed by: FAMILY MEDICINE

## 2023-05-18 PROCEDURE — G0009 ADMIN PNEUMOCOCCAL VACCINE: HCPCS | Performed by: FAMILY MEDICINE

## 2023-05-18 PROCEDURE — 90677 PCV20 VACCINE IM: CPT | Performed by: FAMILY MEDICINE

## 2023-05-18 PROCEDURE — 82947 ASSAY GLUCOSE BLOOD QUANT: CPT | Performed by: FAMILY MEDICINE

## 2023-05-18 PROCEDURE — 82306 VITAMIN D 25 HYDROXY: CPT | Performed by: FAMILY MEDICINE

## 2023-05-18 RX ORDER — FLUTICASONE PROPIONATE 50 MCG
2 SPRAY, SUSPENSION (ML) NASAL DAILY
Qty: 9 ML | Refills: 3 | Status: SHIPPED | OUTPATIENT
Start: 2023-05-18

## 2023-05-18 RX ORDER — ALBUTEROL SULFATE 90 UG/1
2 AEROSOL, METERED RESPIRATORY (INHALATION) EVERY 4 HOURS PRN
Qty: 18 G | Refills: 1 | Status: SHIPPED | OUTPATIENT
Start: 2023-05-18 | End: 2024-08-14

## 2023-05-18 ASSESSMENT — ENCOUNTER SYMPTOMS
WEAKNESS: 0
FEVER: 0
MYALGIAS: 0
ABDOMINAL PAIN: 0
EYE PAIN: 0
HEADACHES: 0
FREQUENCY: 0
NERVOUS/ANXIOUS: 0
CONSTIPATION: 0
PALPITATIONS: 0
ARTHRALGIAS: 0
DYSURIA: 0
COUGH: 0
CHILLS: 0
BREAST MASS: 0
HEMATOCHEZIA: 0
HEARTBURN: 0
DIZZINESS: 0
SHORTNESS OF BREATH: 0
HEMATURIA: 0
DIARRHEA: 0
PARESTHESIAS: 0
JOINT SWELLING: 0
NAUSEA: 0
SORE THROAT: 0

## 2023-05-18 ASSESSMENT — PAIN SCALES - GENERAL: PAINLEVEL: NO PAIN (0)

## 2023-05-18 ASSESSMENT — ACTIVITIES OF DAILY LIVING (ADL): CURRENT_FUNCTION: NO ASSISTANCE NEEDED

## 2023-05-18 NOTE — PATIENT INSTRUCTIONS
Patient Education   Personalized Prevention Plan  You are due for the preventive services outlined below.  Your care team is available to assist you in scheduling these services.  If you have already completed any of these items, please share that information with your care team to update in your medical record.  Health Maintenance Due   Topic Date Due     ANNUAL REVIEW OF HM ORDERS  Never done     Annual Wellness Visit  04/08/2020     Asthma Action Plan - yearly  04/08/2020     Pneumococcal Vaccine (3 - PPSV23 if available, else PCV20) 04/08/2020     Diptheria Tetanus Pertussis (DTAP/TDAP/TD) Vaccine (2 - Td or Tdap) 10/22/2020     Colorectal Cancer Screening  04/28/2022     COVID-19 Vaccine (5 - Moderna series) 02/17/2023

## 2023-05-18 NOTE — LETTER
My Asthma Action Plan    Name: Christ Ca   YOB: 1955  Date: 5/18/2023   My doctor: Latanya Day MD   My clinic: [facility]        My Rescue Medicine:   Albuterol inhaler (Proair/Ventolin/Proventil HFA)  2-4 puffs EVERY 4 HOURS as needed. Use a spacer if recommended by your provider.   My Asthma Severity:   Intermittent / Exercise Induced  Know your asthma triggers: upper respiratory infections and allergie  pollens          GREEN ZONE   Good Control  I feel good  No cough or wheeze  Can work, sleep and play without asthma symptoms       Take your asthma control medicine every day.     If exercise triggers your asthma, take your rescue medication  15 minutes before exercise or sports, and  During exercise if you have asthma symptoms  Spacer to use with inhaler: If you have a spacer, make sure to use it with your inhaler             YELLOW ZONE Getting Worse  I have ANY of these:  I do not feel good  Cough or wheeze  Chest feels tight  Wake up at night   Keep taking your Green Zone medications  Start taking your rescue medicine:  every 20 minutes for up to 1 hour. Then every 4 hours for 24-48 hours.  If you stay in the Yellow Zone for more than 12-24 hours, contact your doctor.  If you do not return to the Green Zone in 12-24 hours or you get worse, start taking your oral steroid medicine if prescribed by your provider.           RED ZONE Medical Alert - Get Help  I have ANY of these:  I feel awful  Medicine is not helping  Breathing getting harder  Trouble walking or talking  Nose opens wide to breathe       Take your rescue medicine NOW  If your provider has prescribed an oral steroid medicine, start taking it NOW  Call your doctor NOW  If you are still in the Red Zone after 20 minutes and you have not reached your doctor:  Take your rescue medicine again and  Call 911 or go to the emergency room right away    See your regular doctor within 2 weeks of an Emergency Room or  Urgent Care visit for follow-up treatment.          Annual Reminders:  Meet with Asthma Educator,  Flu Shot in the Fall, consider Pneumonia Vaccination for patients with asthma (aged 19 and older).    Pharmacy:    [id number] - [place], [place] - [address] AT [address] OF [id number] - [place] - [id number] [address]    Electronically signed by Latanya Day MD   Date: 05/18/23                    Asthma Triggers  How To Control Things That Make Your Asthma Worse    Triggers are things that make your asthma worse.  Look at the list below to help you find your triggers and   what you can do about them. You can help prevent asthma flare-ups by staying away from your triggers.      Trigger                                                          What you can do   Cigarette Smoke  Tobacco smoke can make asthma worse. Do not allow smoking in your home, car or around you.  Be sure no one smokes at a child s day care or school.  If you smoke, ask your health care provider for ways to help you quit.  Ask family members to quit too.  Ask your health care provider for a referral to Quit Plan to help you quit smoking, or call 8-547-918-PLAN.     Colds, Flu, Bronchitis  These are common triggers of asthma. Wash your hands often.  Don t touch your eyes, nose or mouth.  Get a flu shot every year.     Dust Mites  These are tiny bugs that live in cloth or carpet. They are too small to see. Wash sheets and blankets in hot water every week.   Encase pillows and mattress in dust mite proof covers.  Avoid having carpet if you can. If you have carpet, vacuum weekly.   Use a dust mask and HEPA vacuum.   Pollen and Outdoor Mold  Some people are allergic to trees, grass, or weed pollen, or molds. Try to keep your windows closed.  Limit time out doors when pollen count is high.   Ask you health care provider about taking medicine during allergy season.     Animal Dander  Some people are  allergic to skin flakes, urine or saliva from pets with fur or feathers. Keep pets with fur or feathers out of your home.    If you can t keep the pet outdoors, then keep the pet out of your bedroom.  Keep the bedroom door closed.  Keep pets off cloth furniture and away from stuffed toys.     Mice, Rats, and Cockroaches  Some people are allergic to the waste from these pests.   Cover food and garbage.  Clean up spills and food crumbs.  Store grease in the refrigerator.   Keep food out of the bedroom.   Indoor Mold  This can be a trigger if your home has high moisture. Fix leaking faucets, pipes, or other sources of water.   Clean moldy surfaces.  Dehumidify basement if it is damp and smelly.   Smoke, Strong Odors, and Sprays  These can reduce air quality. Stay away from strong odors and sprays, such as perfume, powder, hair spray, paints, smoke incense, paint, cleaning products, candles and new carpet.   Exercise or Sports  Some people with asthma have this trigger. Be active!  Ask your doctor about taking medicine before sports or exercise to prevent symptoms.    Warm up for 5-10 minutes before and after sports or exercise.     Other Triggers of Asthma  Cold air:  Cover your nose and mouth with a scarf.  Sometimes laughing or crying can be a trigger.  Some medicines and food can trigger asthma.

## 2023-05-18 NOTE — PROGRESS NOTES
"SUBJECTIVE:   Christ is a 68 year old who presents for Preventive Visit.      5/18/2023     9:59 AM   Additional Questions   Roomed by Pascale   Patient has been advised of split billing requirements and indicates understanding: Yes  Are you in the first 12 months of your Medicare coverage?  No    Healthy Habits:     In general, how would you rate your overall health?  Good    Frequency of exercise:  4-5 days/week    Duration of exercise:  45-60 minutes    Do you usually eat at least 4 servings of fruit and vegetables a day, include whole grains    & fiber and avoid regularly eating high fat or \"junk\" foods?  Yes    Taking medications regularly:  Yes    Medication side effects:  Not applicable    Ability to successfully perform activities of daily living:  No assistance needed    Home Safety:  Lack of grab bars in the bathroom    Hearing Impairment:  No hearing concerns    In the past 6 months, have you been bothered by leaking of urine?  No    In general, how would you rate your overall mental or emotional health?  Excellent      PHQ-2 Total Score: 0    Additional concerns today:  Yes        Have you ever done Advance Care Planning? (For example, a Health Directive, POLST, or a discussion with a medical provider or your loved ones about your wishes): No, advance care planning information given to patient to review.  Advanced care planning was discussed at today's visit.      Fall risk  Fallen 2 or more times in the past year?: No  Any fall with injury in the past year?: No    Cognitive Screening   1) Repeat 3 items (Leader, Season, Table)    2) Clock draw: NORMAL  3) 3 item recall: Recalls 3 objects  Results: 3 items recalled: COGNITIVE IMPAIRMENT LESS LIKELY    Mini-CogTM Copyright MEÑO Foster. Licensed by the author for use in [facility]; reprinted with permission (tl@.[facility]). All rights reserved.          Reviewed and updated as needed this visit by clinical staff   Tobacco  Allergies  Meds    Surg " Hx           Reviewed and updated as needed this visit by Provider        Surg Hx          Social History     Tobacco Use     Smoking status: Never     Smokeless tobacco: Never     Tobacco comments:     Both Parents smoked   Vaping Use     Vaping status: Not on file   Substance Use Topics     Alcohol use: Yes     Comment: One glass once a week or less             5/18/2023     9:51 AM   Alcohol Use   Prescreen: >3 drinks/day or >7 drinks/week? No     Do you have a current opioid prescription? No  Do you use any other controlled substances or medications that are not prescribed by a provider? None          Asthma Follow-Up    Was ACT completed today?  Yes        5/18/2023    10:29 AM   ACT Total Scores   ACT TOTAL SCORE (Goal Greater than or Equal to 20) 24   In the past 12 months, how many times did you visit the emergency room for your asthma without being admitted to the hospital? 0   In the past 12 months, how many times were you hospitalized overnight because of your asthma? 0          How many days per week do you miss taking your asthma controller medication?  I do not have an asthma controller medication    Please describe any recent triggers for your asthma: allergies    Have you had any Emergency Room Visits, Urgent Care Visits, or Hospital Admissions since your last office visit?  No      Current providers sharing in care for this patient include:   Patient Care Team:  Latanya Day MD as PCP - General  Latanya Day MD as Assigned PCP    The following health maintenance items are reviewed in Epic and correct as of today:  Health Maintenance   Topic Date Due     DTAP/TDAP/TD IMMUNIZATION (2 - Td or Tdap) 10/22/2020     COLORECTAL CANCER SCREENING  04/28/2022     COVID-19 Vaccine (5 - Moderna series) 02/17/2023     ASTHMA CONTROL TEST  11/18/2023     LIPID  04/08/2024     ADVANCE CARE PLANNING  04/08/2024     MEDICARE ANNUAL WELLNESS VISIT  05/18/2024     ANNUAL REVIEW OF HM ORDERS  05/18/2024     ASTHMA  ACTION PLAN  2024     FALL RISK ASSESSMENT  2024     MAMMO SCREENING  2025     DEXA  04/15/2034     HEPATITIS C SCREENING  Completed     PHQ-2 (once per calendar year)  Completed     INFLUENZA VACCINE  Completed     Pneumococcal Vaccine: 65+ Years  Completed     ZOSTER IMMUNIZATION  Completed     IPV IMMUNIZATION  Aged Out     MENINGITIS IMMUNIZATION  Aged Out     Lab work is in process  Labs reviewed in EPIC  BP Readings from Last 3 Encounters:   23 135/88   09/10/21 130/72   19 138/70    Wt Readings from Last 3 Encounters:   23 80.9 kg (178 lb 6.4 oz)   19 72.1 kg (159 lb)   19 74.4 kg (164 lb)                  Patient Active Problem List   Diagnosis     Chronic rhinitis     Intermittent asthma     CARDIOVASCULAR SCREENING; LDL GOAL LESS THAN 160     Chronic cough     Advanced directives, counseling/discussion     Atrophic vaginitis     Family history of colon cancer     Past Surgical History:   Procedure Laterality Date     ABDOMEN SURGERY  3/31/79    [place]     C/SECTION, LOW TRANSVERSE      , Low Transverse     COLONOSCOPY       SURGICAL HISTORY OF -   age 9    Eye     TONSILLECTOMY         Social History     Tobacco Use     Smoking status: Never     Smokeless tobacco: Never     Tobacco comments:     Both Parents smoked   Vaping Use     Vaping status: Not on file   Substance Use Topics     Alcohol use: Yes     Comment: One glass once a week or less     Family History   Problem Relation Age of Onset     Cancer - colorectal Mother         age 78     Cancer Mother         Lung ca age 72     Heart Disease Mother         age 72     Coronary Artery Disease Mother      Hypertension Mother      Hyperlipidemia Mother      Colon Cancer Mother      Other Cancer Mother         Lung Cancer     Alcohol/Drug Father      Alzheimer Disease Father      Depression Brother         1      Depression Sister         has Depression,1 sister has Bipolar     Heart Disease  Brother         age 40     Substance Abuse Sister          Current Outpatient Medications   Medication Sig Dispense Refill     albuterol (PROAIR HFA/PROVENTIL HFA/VENTOLIN HFA) 108 (90 Base) MCG/ACT inhaler Inhale 2 puffs into the lungs every 4 hours as needed for shortness of breath or wheezing 18 g 1     albuterol (PROAIR HFA/PROVENTIL HFA/VENTOLIN HFA) 108 (90 Base) MCG/ACT inhaler Inhale 2 puffs into the lungs every 6 hours 6.7 g 3     Fexofenadine HCl (ALLEGRA PO)        fluticasone (FLONASE) 50 MCG/ACT nasal spray Spray 2 sprays into both nostrils daily 9 mL 3     Tdap, tetanus-diptheria-acell pertussis, (BOOSTRIX) 5-2.5-18.5 LF-MCG/0.5 JUWAN injection Inject 0.5 mLs into the muscle once for 1 dose 0.5 mL 0     acetaminophen (TYLENOL) 500 MG tablet Take 500-1,000 mg by mouth every 6 hours as needed for mild pain (Patient not taking: Reported on 9/20/2021)       GuaiFENesin (MUCINEX PO)  (Patient not taking: Reported on 9/20/2021)       Pseudoeph-Doxylamine-DM-APAP (NYQUIL PO)  (Patient not taking: Reported on 9/20/2021)       Allergies   Allergen Reactions     Sulfa Antibiotics      Recent Labs   Lab Test 04/08/19  1000   LDL 89   HDL 51   TRIG 130      Mammogram Screening: pt has had her mammogram    FHS-7:       1/26/2023    10:20 AM 5/15/2023     3:25 PM 5/18/2023     9:51 AM   Breast CA Risk Assessment (FHS-7)   Did any of your first-degree relatives have breast or ovarian cancer? No No No   Did any of your relatives have bilateral breast cancer? No No No   Did any man in your family have breast cancer? No No No   Did any woman in your family have breast and ovarian cancer? No No No   Did any woman in your family have breast cancer before age 50 y? No No No   Do you have 2 or more relatives with breast and/or ovarian cancer? No No No   Do you have 2 or more relatives with breast and/or bowel cancer? No No No   aging tab.    Review of Systems   Constitutional: Negative for chills and fever.   HENT: Positive  "for congestion. Negative for ear pain, hearing loss and sore throat.    Eyes: Negative for pain and visual disturbance.   Respiratory: Negative for cough and shortness of breath.    Cardiovascular: Negative for chest pain, palpitations and peripheral edema.   Gastrointestinal: Negative for abdominal pain, constipation, diarrhea, heartburn, hematochezia and nausea.   Breasts:  Negative for tenderness, breast mass and discharge.   Genitourinary: Negative for dysuria, frequency, genital sores, hematuria, pelvic pain, urgency, vaginal bleeding and vaginal discharge.   Musculoskeletal: Negative for arthralgias, joint swelling and myalgias.   Skin: Negative for rash.   Neurological: Negative for dizziness, weakness, headaches and paresthesias.   Psychiatric/Behavioral: Negative for mood changes. The patient is not nervous/anxious.      Rest of the ROS is Negative except see above and Problem list [stable]  Has allergic Rhinitis    OBJECTIVE:   /88   Pulse 78   Temp 97.6  F (36.4  C) (Temporal)   Ht 1.701 m (5' 6.97\")   Wt 80.9 kg (178 lb 6.4 oz)   SpO2 98%   BMI 27.97 kg/m   Estimated body mass index is 27.97 kg/m  as calculated from the following:    Height as of this encounter: 1.701 m (5' 6.97\").    Weight as of this encounter: 80.9 kg (178 lb 6.4 oz).  Physical Exam  GENERAL APPEARANCE: healthy, alert and no distress  EYES: Eyes grossly normal to inspection, PERRL and conjunctivae and sclerae normal  HENT: ear canals and TM's normal, nose and mouth without ulcers or lesions, oropharynx clear and oral mucous membranes moist  NECK: no adenopathy, no asymmetry, masses, or scars and thyroid normal to palpation  RESP: lungs clear to auscultation - no rales, rhonchi or wheezes  BREAST: normal without masses, tenderness or nipple discharge and no palpable axillary masses or adenopathy  CV: regular rate and rhythm, normal S1 S2, no S3 or S4, no murmur, click or rub, no peripheral edema and peripheral pulses " strong  ABDOMEN: soft, nontender, no hepatosplenomegaly, no masses and bowel sounds normal  MS: no musculoskeletal defects are noted and gait is age appropriate without ataxia  SKIN: no suspicious lesions or rashes  NEURO: Normal strength and tone, sensory exam grossly normal, mentation intact and speech normal  PSYCH: mentation appears normal and affect normal/bright    Diagnostic Test Results:  Labs reviewed in Epic  Pending     ASSESSMENT / PLAN:   (Z00.00) Encounter for Medicare annual wellness exam  Comment:   Plan: PRIMARY CARE FOLLOW-UP SCHEDULING, Lipid panel         reflex to direct LDL Fasting, Glucose, TSH with        free T4 reflex            (M85.80) Osteopenia, unspecified location  Comment: pending   Plan: Parathyroid Hormone Intact, Vitamin D         Deficiency, Calcium, Phosphorus        Advised Dexa  Pt exercises, rides her Bike    (J45.20) Mild intermittent asthma without complication  Comment: stable   Plan:     (J31.0) Chronic rhinitis  Comment: advised   Allegra or zyrtec otc  Plan: fluticasone (FLONASE) 50 MCG/ACT nasal spray            (Z80.0) Family history of colon cancer  Comment: advised   Plan: Colonoscopy Screening  Referral            (Z23) Need for diphtheria-tetanus-pertussis (Tdap) vaccine  Comment: advised   Plan: Tdap, tetanus-diptheria-acell pertussis,         (BOOSTRIX) 5-2.5-18.5 LF-MCG/0.5 JUWAN injection            (Z12.11) Screen for colon cancer  Comment: advised colonoscopy  Plan:        Referral        done    (Z78.0) Asymptomatic postmenopausal status  Comment: advised   Plan: DX Hip/Pelvis/Spine            COUNSELING:  Reviewed preventive health counseling, as reflected in patient instructions       Regular exercise       Healthy diet/nutrition       Vision screening       Hearing screening       Dental care       Bladder control       Fall risk prevention       Osteoporosis prevention/bone health       Colon cancer screening       The 10-year ASCVD risk score  "(Elvin FORREST, et al., 2019) is: 9%    Values used to calculate the score:      Age: 68 years      Sex: Female      Is Non- : No      Diabetic: No      Tobacco smoker: No      Systolic Blood Pressure: 135 mmHg      Is BP treated: No      HDL Cholesterol: 51 mg/dL      Total Cholesterol: 166 mg/dL       Advanced Planning       BMI:   Estimated body mass index is 27.97 kg/m  as calculated from the following:    Height as of this encounter: 1.701 m (5' 6.97\").    Weight as of this encounter: 80.9 kg (178 lb 6.4 oz).         She reports that she has never smoked. She has never used smokeless tobacco.      Appropriate preventive services were discussed with this patient, including applicable screening as appropriate for cardiovascular disease, diabetes, osteopenia/osteoporosis, and glaucoma.  As appropriate for age/gender, discussed screening for colorectal cancer, prostate cancer, breast cancer, and cervical cancer. Checklist reviewing preventive services available has been given to the patient.    Reviewed patients plan of care and provided an AVS. The Intermediate Care Plan ( asthma action plan, low back pain action plan, and migraine action plan) for Christ meets the Care Plan requirement. This Care Plan has been established and reviewed with the Patient.      Latanya Day MD  [facility]    Identified Health Risks:    "

## 2023-05-19 LAB — DEPRECATED CALCIDIOL+CALCIFEROL SERPL-MC: 73 UG/L (ref 20–75)

## 2023-05-19 ASSESSMENT — ASTHMA QUESTIONNAIRES: ACT_TOTALSCORE: 24

## 2023-05-31 ENCOUNTER — ANCILLARY PROCEDURE (OUTPATIENT)
Dept: BONE DENSITY | Facility: CLINIC | Age: 68
End: 2023-05-31
Attending: FAMILY MEDICINE
Payer: COMMERCIAL

## 2023-05-31 DIAGNOSIS — Z78.0 ASYMPTOMATIC POSTMENOPAUSAL STATUS: ICD-10-CM

## 2023-05-31 PROCEDURE — 77080 DXA BONE DENSITY AXIAL: CPT | Performed by: INTERNAL MEDICINE

## 2023-09-29 ENCOUNTER — TELEPHONE (OUTPATIENT)
Dept: FAMILY MEDICINE | Facility: CLINIC | Age: 68
End: 2023-09-29

## 2023-09-29 NOTE — LETTER
September 29, 2023      [place]  [address] [address] [address]    Your team at [facility] cares about your health. We have reviewed your chart and based on our findings; we are making the following recommendations to better manage your health.     You are in particular need of attention regarding the following:     Call or MyChart message your clinic to schedule a colonoscopy, schedule/ a FIT Test, or order a Cologuard test. If you are unsure what type of test you need, please call your clinic and speak to clinic staff.   Colon cancer is now the second leading cause of cancer-related deaths in the [place] for both men and women and there are over 130,000 new cases and 50,000 deaths per year from colon cancer. Colonoscopies can prevent 90-95% of these deaths. Problem lesions can be removed before they ever become cancer. This test is not only looking for cancer, but also getting rid of precancerous lesions.   If you are under/uninsured, we recommend you contact the [employer] Program.[employer] is a free colorectal cancer screening program that provides colonoscopies for eligible under/uninsured [place] men and women. If you are interested in receiving a free colonoscopy, please call [employer] at t [phone] (mention code [employer]) to see if you're eligible. Please have them send us the results.     If you have already completed these items, please contact the clinic via phone or   [employer] so your care team can review and update your records. Thank you for   choosing [facility] for your healthcare needs. For any questions,   concerns, or to schedule an appointment please contact our clinic.    Healthy Regards,      Your [facility] Care Team

## 2023-09-29 NOTE — TELEPHONE ENCOUNTER
Patient Quality Outreach    Patient is due for the following:   Colon Cancer Screening    Next Steps:   Needs colonoscopy     Type of outreach:    Sent letter.    Next Steps:  Reach out within 90 days via Letter.    Max number of attempts reached: No. Will try again in 90 days if patient still on fail list.    Questions for provider review:    None           Kristen Castro CMA  Chart routed to me .

## 2023-10-23 ENCOUNTER — TELEPHONE (OUTPATIENT)
Dept: GASTROENTEROLOGY | Facility: CLINIC | Age: 68
End: 2023-10-23
Payer: COMMERCIAL

## 2023-10-23 NOTE — TELEPHONE ENCOUNTER
"Endoscopy Scheduling Screen    Have you had a positive Covid test in the last 14 days?  No    Are you active on MyChart?   Yes    What insurance is in the chart?  Other:  [employer]    Ordering/Referring Provider: SANA LUQUE   (If ordering provider performs procedure, schedule with ordering provider unless otherwise instructed. )    BMI: Estimated body mass index is 27.97 kg/m  as calculated from the following:    Height as of 5/18/23: 1.701 m (5' 6.97\").    Weight as of 5/18/23: 80.9 kg (178 lb 6.4 oz).     Sedation Ordered  moderate sedation.   If patient BMI > 50 do not schedule in [facility].    If patient BMI > 45 do not schedule at [facility].    Are you taking methadone or Suboxone?  No    Are you taking any prescription medications for pain 3 or more times per week?   No    Do you have a history of malignant hyperthermia or adverse reaction to anesthesia?  No    (Females) Are you currently pregnant?   No     Have you been diagnosed or told you have pulmonary hypertension?   No    Do you have an LVAD?  No    Have you been told you have moderate to severe sleep apnea?  No    Have you been told you have COPD, asthma, or any other lung disease?  Yes     What breathing problems do you have?  Asthma     Do you use home oxygen?  No    Have your breathing problems required an ED visit or hospitalization in the last year?  No    Do you have any heart conditions?  No     Have you ever had an organ transplant?   No    Have you ever had or are you awaiting a heart or lung transplant?   No    Have you had a stroke or transient ischemic attack (TIA aka \"mini stroke\" in the last 6 months?   No    Have you been diagnosed with or been told you have cirrhosis of the liver?   No    Are you currently on dialysis?   No    Do you need assistance transferring?   No    BMI: Estimated body mass index is 27.97 kg/m  as calculated from the following:    Height as of 5/18/23: 1.701 m (5' 6.97\").    Weight as of 5/18/23: 80.9 kg (178 lb 6.4 oz). "     Is patients BMI > 40 and scheduling location [facility]?  No    Do you take an injectable medication for weight loss or diabetes (excluding insulin)?  No    Do you take the medication Naltrexone?  No    Do you take blood thinners?  No       Prep   Are you currently on dialysis or do you have chronic kidney disease?  No    Do you have a diagnosis of diabetes?  No    Do you have a diagnosis of cystic fibrosis (CF)?  No    On a regular basis do you go 3 -5 days between bowel movements?  No    BMI > 40?  No    Preferred Pharmacy:    [facility]/pharmacy #[id number] - [place], [place] - [address]  [address] [address]  Phone: [phone] Fax: [phone]    Final Scheduling Details   Colonoscopy prep sent?  Standard MiraLAX    Procedure scheduled  Colonoscopy    Surgeon:  NATHALIA     Date of procedure:  1/22/24     Pre-OP / PAC:   No - Not required for this site.    Location  [facility] - [facility] - Per order.    Sedation   Moderate Sedation - Per order.      Patient Reminders:   You will receive a call from a Nurse to review instructions and health history.  This assessment must be completed prior to your procedure.  Failure to complete the Nurse assessment may result in the procedure being cancelled.      On the day of your procedure, please designate an adult(s) who can drive you home stay with you for the next 24 hours. The medicines used in the exam will make you sleepy. You will not be able to drive.      You cannot take public transportation, ride share services, or non-medical taxi service without a responsible caregiver.  Medical transport services are allowed with the requirement that a responsible caregiver will receive you at your destination.  We require that drivers and caregivers are confirmed prior to your procedure.

## 2023-10-23 NOTE — TELEPHONE ENCOUNTER
[employer] message sent to pre assessment team in regards to patient requesting miralax w/o mg citrate prep instructions.     Updated prep instructions sent via [employer] per patient's request.     Araceli Mcclain RN  Endoscopy Procedure Pre Assessment RN  [phone] option 4

## 2023-10-31 NOTE — TELEPHONE ENCOUNTER
Patient Quality Outreach    Patient is due for the following:   Colon Cancer Screening    Next Steps:   Needs colonoscopy     Type of outreach:    Sent [employer] message.    Next Steps:  Reach out within 90 days via [employer]hart.    Max number of attempts reached: Yes. Will try again in 90 days if patient still on fail list.    Questions for provider review:    None           Kristen Castro CMA  Chart routed to me .

## 2023-11-09 DIAGNOSIS — Z12.11 COLON CANCER SCREENING: ICD-10-CM

## 2023-11-23 ENCOUNTER — LAB (OUTPATIENT)
Dept: FAMILY MEDICINE | Facility: CLINIC | Age: 68
End: 2023-11-23
Payer: COMMERCIAL

## 2023-11-23 DIAGNOSIS — Z12.11 COLON CANCER SCREENING: ICD-10-CM

## 2024-01-06 ENCOUNTER — TELEPHONE (OUTPATIENT)
Dept: GASTROENTEROLOGY | Facility: CLINIC | Age: 69
End: 2024-01-06
Payer: COMMERCIAL

## 2024-01-06 NOTE — TELEPHONE ENCOUNTER
Pre visit planning completed.      Procedure details:    Patient scheduled for Colonoscopy  on 1/22/24.     Arrival time: 0830. Procedure time 0915    Pre op exam needed? N/A    Facility location: [facility]; [address], [place] [address]    Sedation type: Conscious sedation     Indication for procedure: Screening      Chart review:     Electronic implanted devices? No    Recent diagnosis of diverticulitis within the last 6 weeks? No    Diabetic? No      Medication review:    Anticoagulants? No    NSAIDS? No NSAID medications per patient's medication list.  RN will verify with pre-assessment call.    Other medication HOLDING recommendations:  N/A      Prep for procedure:     Bowel prep recommendation: Miralax prep without magnesium citrate   Due to:  patient request/preference.  See TE from 10/23/23.     Prep instructions sent via [employer]       Vibha Long RN  Endoscopy Procedure Pre Assessment RN  [phone] option 4

## 2024-01-08 NOTE — TELEPHONE ENCOUNTER
Pre assessment completed for upcoming procedure.   (Please see previous telephone encounter notes for complete details)      Procedure details:    Arrival time and facility location reviewed.    Pre op exam needed? N/A    Designated  policy reviewed. Instructed to have someone stay 6 hours post procedure.     COVID policy reviewed.      Medication review:    Medications reviewed. Please see supporting documentation below. Holding recommendations discussed (if applicable).       Prep for procedure:     Procedure prep instructions reviewed.        Additional information needed?  N/A      Patient  verbalized understanding and had no questions or concerns at this time.      Vibha Long RN  Endoscopy Procedure Pre Assessment RN  [phone] option 4

## 2024-01-22 ENCOUNTER — HOSPITAL ENCOUNTER (OUTPATIENT)
Facility: AMBULATORY SURGERY CENTER | Age: 69
Discharge: HOME | End: 2024-01-22
Attending: INTERNAL MEDICINE | Admitting: INTERNAL MEDICINE
Payer: COMMERCIAL

## 2024-01-22 VITALS
HEART RATE: 73 BPM | DIASTOLIC BLOOD PRESSURE: 69 MMHG | OXYGEN SATURATION: 96 % | TEMPERATURE: 96.8 F | SYSTOLIC BLOOD PRESSURE: 115 MMHG | RESPIRATION RATE: 16 BRPM

## 2024-01-22 LAB — COLONOSCOPY: NORMAL

## 2024-01-22 PROCEDURE — G8918 PT W/O PREOP ORDER IV AB PRO: HCPCS

## 2024-01-22 PROCEDURE — G8907 PT DOC NO EVENTS ON DISCHARG: HCPCS

## 2024-01-22 PROCEDURE — 45385 COLONOSCOPY W/LESION REMOVAL: CPT

## 2024-01-22 PROCEDURE — 88305 TISSUE EXAM BY PATHOLOGIST: CPT | Performed by: PATHOLOGY

## 2024-01-22 RX ORDER — NALOXONE HYDROCHLORIDE 0.4 MG/ML
0.4 INJECTION, SOLUTION INTRAMUSCULAR; INTRAVENOUS; SUBCUTANEOUS
Status: DISCONTINUED | OUTPATIENT
Start: 2024-01-22 | End: 2024-01-23 | Stop reason: HOSPADM

## 2024-01-22 RX ORDER — NALOXONE HYDROCHLORIDE 0.4 MG/ML
0.2 INJECTION, SOLUTION INTRAMUSCULAR; INTRAVENOUS; SUBCUTANEOUS
Status: DISCONTINUED | OUTPATIENT
Start: 2024-01-22 | End: 2024-01-23 | Stop reason: HOSPADM

## 2024-01-22 RX ORDER — LIDOCAINE 40 MG/G
CREAM TOPICAL
Status: DISCONTINUED | OUTPATIENT
Start: 2024-01-22 | End: 2024-01-23 | Stop reason: HOSPADM

## 2024-01-22 RX ORDER — ONDANSETRON 2 MG/ML
4 INJECTION INTRAMUSCULAR; INTRAVENOUS EVERY 6 HOURS PRN
Status: DISCONTINUED | OUTPATIENT
Start: 2024-01-22 | End: 2024-01-23 | Stop reason: HOSPADM

## 2024-01-22 RX ORDER — ONDANSETRON 2 MG/ML
4 INJECTION INTRAMUSCULAR; INTRAVENOUS
Status: DISCONTINUED | OUTPATIENT
Start: 2024-01-22 | End: 2024-01-23 | Stop reason: HOSPADM

## 2024-01-22 RX ORDER — FENTANYL CITRATE 50 UG/ML
INJECTION, SOLUTION INTRAMUSCULAR; INTRAVENOUS PRN
Status: DISCONTINUED | OUTPATIENT
Start: 2024-01-22 | End: 2024-01-22 | Stop reason: HOSPADM

## 2024-01-22 RX ORDER — ONDANSETRON 4 MG/1
4 TABLET, ORALLY DISINTEGRATING ORAL EVERY 6 HOURS PRN
Status: DISCONTINUED | OUTPATIENT
Start: 2024-01-22 | End: 2024-01-23 | Stop reason: HOSPADM

## 2024-01-22 RX ORDER — FLUMAZENIL 0.1 MG/ML
0.2 INJECTION, SOLUTION INTRAVENOUS
Status: ACTIVE | OUTPATIENT
Start: 2024-01-22 | End: 2024-01-22

## 2024-01-22 RX ORDER — PROCHLORPERAZINE MALEATE 5 MG
5 TABLET ORAL EVERY 6 HOURS PRN
Status: DISCONTINUED | OUTPATIENT
Start: 2024-01-22 | End: 2024-01-23 | Stop reason: HOSPADM

## 2024-01-22 NOTE — H&P
ENDOSCOPY PRE-SEDATION H&P FOR OUTPATIENT PROCEDURES    Christ Ca  [id number]  1955    Procedure: colonoscopy    Pre-procedure diagnosis: fam hx CRC in mother >age 60    Past medical history:   Past Medical History:   Diagnosis Date    Arthritis     Asthma     GERD (gastroesophageal reflux disease)        Past surgical history:   Past Surgical History:   Procedure Laterality Date    ABDOMEN SURGERY  3/31/79    [facility]    C/SECTION, LOW TRANSVERSE      , Low Transverse    COLONOSCOPY      SURGICAL HISTORY OF -   age 9    Eye    TONSILLECTOMY         Current Outpatient Medications   Medication    albuterol (PROAIR HFA/PROVENTIL HFA/VENTOLIN HFA) 108 (90 Base) MCG/ACT inhaler    acetaminophen (TYLENOL) 500 MG tablet    albuterol (PROAIR HFA/PROVENTIL HFA/VENTOLIN HFA) 108 (90 Base) MCG/ACT inhaler    Fexofenadine HCl (ALLEGRA PO)    fluticasone (FLONASE) 50 MCG/ACT nasal spray    GuaiFENesin (MUCINEX PO)    Pseudoeph-Doxylamine-DM-APAP (NYQUIL PO)     Current Facility-Administered Medications   Medication    lidocaine (LMX4) kit    lidocaine 1 % 0.1-1 mL    ondansetron (ZOFRAN) injection 4 mg    sodium chloride (PF) 0.9% PF flush 3 mL    sodium chloride (PF) 0.9% PF flush 3 mL       Allergies   Allergen Reactions    Sulfa Antibiotics        History of Anesthesia/Sedation Problems: no    PHYSICAL EXAMINATION:  Constitutional: aaox3, cooperative, pleasant  Vitals reviewed: BP (!) 141/75   Pulse 80   Temp 96.8  F (36  C) (Temporal)   Resp 16   SpO2 96%   Wt:   Wt Readings from Last 2 Encounters:   23 80.9 kg (178 lb 6.4 oz)   19 72.1 kg (159 lb)      Eyes: Sclera anicteric/injected  Ears/nose/mouth/throat: Normal oropharynx without ulcers or exudate, mucus membranes moist, hearing intact  Neck: supple, thyroid normal size  CV: No edema  Respiratory: Unlabored breathing  Lymph: No submandibular, supraclavicular or inguinal lymphadenopathy  Abd: Nondistended, no masses,  nontender  Skin: warm, perfused, no jaundice  Psych: Normal affect  MSK: normal movement on limited exam.    ASA Score: See Provation note    Assessment/Plan:     The patient is an appropriate candidate to receive sedation.    Informed consent was discussed with the patient/family, including the risks, benefits, potential complications and any alternative options associated with sedation.    Patient assessment completed just prior to sedation and while under constant observation by the provider. Condition determined to be adequate for proceeding with sedation.    The specific risks for the procedure were discussed with the patient at the time of informed consent and include but are not limited to perforation which could require surgery, missing significant neoplasm or lesion, hemorrhage and adverse sedative complication.      Luis Juarez MD

## 2024-01-24 LAB
PATH REPORT.COMMENTS IMP SPEC: NORMAL
PATH REPORT.COMMENTS IMP SPEC: NORMAL
PATH REPORT.FINAL DX SPEC: NORMAL
PATH REPORT.GROSS SPEC: NORMAL
PATH REPORT.MICROSCOPIC SPEC OTHER STN: NORMAL
PATH REPORT.RELEVANT HX SPEC: NORMAL
PHOTO IMAGE: NORMAL

## 2024-04-18 ENCOUNTER — PATIENT OUTREACH (OUTPATIENT)
Dept: CARE COORDINATION | Facility: CLINIC | Age: 69
End: 2024-04-18
Payer: COMMERCIAL

## 2024-05-02 ENCOUNTER — PATIENT OUTREACH (OUTPATIENT)
Dept: CARE COORDINATION | Facility: CLINIC | Age: 69
End: 2024-05-02
Payer: COMMERCIAL

## 2024-05-23 ENCOUNTER — TELEPHONE (OUTPATIENT)
Dept: FAMILY MEDICINE | Facility: CLINIC | Age: 69
End: 2024-05-23
Payer: COMMERCIAL

## 2024-05-23 NOTE — TELEPHONE ENCOUNTER
Patient Quality Outreach    Patient is due for the following:   Asthma  -  Asthma follow-up visit  Physical Annual Wellness Visit    Next Steps:   Schedule a Adult Preventative    Type of outreach:    Chart review performed, no outreach needed. Patient has visit scheduled in August.    Next Steps:  Reach out within 90 days via Letter.    Max number of attempts reached: No. Will try again in 90 days if patient still on fail list.    Questions for provider review:    None           AMIRA VAZQUEZ MA  Chart routed to self.

## 2024-06-17 PROBLEM — Z71.89 ADVANCED DIRECTIVES, COUNSELING/DISCUSSION: Status: RESOLVED | Noted: 2017-03-13 | Resolved: 2024-06-17

## 2024-06-30 ENCOUNTER — HEALTH MAINTENANCE LETTER (OUTPATIENT)
Age: 69
End: 2024-06-30

## 2024-08-11 SDOH — HEALTH STABILITY: PHYSICAL HEALTH: ON AVERAGE, HOW MANY MINUTES DO YOU ENGAGE IN EXERCISE AT THIS LEVEL?: 60 MIN

## 2024-08-11 SDOH — HEALTH STABILITY: PHYSICAL HEALTH: ON AVERAGE, HOW MANY DAYS PER WEEK DO YOU ENGAGE IN MODERATE TO STRENUOUS EXERCISE (LIKE A BRISK WALK)?: 4 DAYS

## 2024-08-11 ASSESSMENT — ASTHMA QUESTIONNAIRES
ACT_TOTALSCORE: 21
QUESTION_1 LAST FOUR WEEKS HOW MUCH OF THE TIME DID YOUR ASTHMA KEEP YOU FROM GETTING AS MUCH DONE AT WORK, SCHOOL OR AT HOME: NONE OF THE TIME
ACT_TOTALSCORE: 21
QUESTION_3 LAST FOUR WEEKS HOW OFTEN DID YOUR ASTHMA SYMPTOMS (WHEEZING, COUGHING, SHORTNESS OF BREATH, CHEST TIGHTNESS OR PAIN) WAKE YOU UP AT NIGHT OR EARLIER THAN USUAL IN THE MORNING: NOT AT ALL
QUESTION_2 LAST FOUR WEEKS HOW OFTEN HAVE YOU HAD SHORTNESS OF BREATH: ONCE OR TWICE A WEEK
QUESTION_4 LAST FOUR WEEKS HOW OFTEN HAVE YOU USED YOUR RESCUE INHALER OR NEBULIZER MEDICATION (SUCH AS ALBUTEROL): TWO OR THREE TIMES PER WEEK
QUESTION_5 LAST FOUR WEEKS HOW WOULD YOU RATE YOUR ASTHMA CONTROL: WELL CONTROLLED

## 2024-08-11 ASSESSMENT — SOCIAL DETERMINANTS OF HEALTH (SDOH): HOW OFTEN DO YOU GET TOGETHER WITH FRIENDS OR RELATIVES?: ONCE A WEEK

## 2024-08-14 ENCOUNTER — OFFICE VISIT (OUTPATIENT)
Dept: FAMILY MEDICINE | Facility: CLINIC | Age: 69
End: 2024-08-14
Attending: FAMILY MEDICINE
Payer: COMMERCIAL

## 2024-08-14 VITALS
WEIGHT: 175 LBS | SYSTOLIC BLOOD PRESSURE: 130 MMHG | OXYGEN SATURATION: 99 % | HEART RATE: 71 BPM | RESPIRATION RATE: 16 BRPM | TEMPERATURE: 98.2 F | DIASTOLIC BLOOD PRESSURE: 82 MMHG | HEIGHT: 67 IN | BODY MASS INDEX: 27.47 KG/M2

## 2024-08-14 DIAGNOSIS — Z29.11 NEED FOR VACCINATION AGAINST RESPIRATORY SYNCYTIAL VIRUS: ICD-10-CM

## 2024-08-14 DIAGNOSIS — M85.80 OSTEOPENIA, UNSPECIFIED LOCATION: ICD-10-CM

## 2024-08-14 DIAGNOSIS — Z80.0 FAMILY HISTORY OF COLON CANCER: ICD-10-CM

## 2024-08-14 DIAGNOSIS — Z12.31 VISIT FOR SCREENING MAMMOGRAM: ICD-10-CM

## 2024-08-14 DIAGNOSIS — R73.01 IFG (IMPAIRED FASTING GLUCOSE): ICD-10-CM

## 2024-08-14 DIAGNOSIS — N94.10 DYSPAREUNIA IN FEMALE: ICD-10-CM

## 2024-08-14 DIAGNOSIS — J45.20 MILD INTERMITTENT ASTHMA WITHOUT COMPLICATION: ICD-10-CM

## 2024-08-14 DIAGNOSIS — N95.2 ATROPHIC VAGINITIS: ICD-10-CM

## 2024-08-14 DIAGNOSIS — Z00.00 ENCOUNTER FOR MEDICARE ANNUAL WELLNESS EXAM: ICD-10-CM

## 2024-08-14 LAB
FASTING STATUS PATIENT QL REPORTED: YES
GLUCOSE SERPL-MCNC: 104 MG/DL (ref 70–99)
HBA1C MFR BLD: 5.8 % (ref 0–5.6)

## 2024-08-14 PROCEDURE — 36415 COLL VENOUS BLD VENIPUNCTURE: CPT | Performed by: FAMILY MEDICINE

## 2024-08-14 PROCEDURE — 82947 ASSAY GLUCOSE BLOOD QUANT: CPT | Performed by: FAMILY MEDICINE

## 2024-08-14 PROCEDURE — 83036 HEMOGLOBIN GLYCOSYLATED A1C: CPT | Performed by: FAMILY MEDICINE

## 2024-08-14 PROCEDURE — 99214 OFFICE O/P EST MOD 30 MIN: CPT | Mod: 25 | Performed by: FAMILY MEDICINE

## 2024-08-14 PROCEDURE — G0439 PPPS, SUBSEQ VISIT: HCPCS | Performed by: FAMILY MEDICINE

## 2024-08-14 RX ORDER — TRETINOIN 0.25 MG/G
CREAM TOPICAL AT BEDTIME
COMMUNITY
Start: 2024-06-25

## 2024-08-14 RX ORDER — ALBUTEROL SULFATE 90 UG/1
2 AEROSOL, METERED RESPIRATORY (INHALATION) EVERY 6 HOURS
Qty: 6.7 G | Refills: 3 | Status: SHIPPED | OUTPATIENT
Start: 2024-08-14

## 2024-08-14 RX ORDER — ESTRADIOL 0.05 MG/D
1 PATCH, EXTENDED RELEASE TRANSDERMAL
Qty: 8 PATCH | Refills: 11 | Status: SHIPPED | OUTPATIENT
Start: 2024-08-15

## 2024-08-14 ASSESSMENT — PAIN SCALES - GENERAL: PAINLEVEL: NO PAIN (0)

## 2024-08-14 NOTE — LETTER
My Asthma Action Plan    Name: Christ Ca   YOB: 1955  Date: 8/14/2024   My doctor: Latanya Day MD   My clinic: [facility]        My Rescue Medicine:   Albuterol inhaler (Proair/Ventolin/Proventil HFA)  2-4 puffs EVERY 4 HOURS as needed. Use a spacer if recommended by your provider.   My Asthma Severity:   Intermittent / Exercise Induced  Know your asthma triggers: exercise or sports and humidity  allergies          GREEN ZONE   Good Control  I feel good  No cough or wheeze  Can work, sleep and play without asthma symptoms       Take your asthma control medicine every day.     If exercise triggers your asthma, take your rescue medication  15 minutes before exercise or sports, and  During exercise if you have asthma symptoms  Spacer to use with inhaler: If you have a spacer, make sure to use it with your inhaler             YELLOW ZONE Getting Worse  I have ANY of these:  I do not feel good  Cough or wheeze  Chest feels tight  Wake up at night   Keep taking your Green Zone medications  Start taking your rescue medicine:  every 20 minutes for up to 1 hour. Then every 4 hours for 24-48 hours.  If you stay in the Yellow Zone for more than 12-24 hours, contact your doctor.  If you do not return to the Green Zone in 12-24 hours or you get worse, start taking your oral steroid medicine if prescribed by your provider.           RED ZONE Medical Alert - Get Help  I have ANY of these:  I feel awful  Medicine is not helping  Breathing getting harder  Trouble walking or talking  Nose opens wide to breathe       Take your rescue medicine NOW  If your provider has prescribed an oral steroid medicine, start taking it NOW  Call your doctor NOW  If you are still in the Red Zone after 20 minutes and you have not reached your doctor:  Take your rescue medicine again and  Call 911 or go to the emergency room right away    See your regular doctor within 2 weeks of an Emergency Room or Urgent  Care visit for follow-up treatment.          Annual Reminders:  Meet with Asthma Educator,  Flu Shot in the Fall, consider Pneumonia Vaccination for patients with asthma (aged 19 and older).    Pharmacy:    [facility][id number] - [place], [place] - [phone] [facility] [facility] [address] OF [id number] - [place] - [id number] [facility]    Electronically signed by Latanya Day MD   Date: 08/14/24                    Asthma Triggers  How To Control Things That Make Your Asthma Worse    Triggers are things that make your asthma worse.  Look at the list below to help you find your triggers and   what you can do about them. You can help prevent asthma flare-ups by staying away from your triggers.      Trigger                                                          What you can do   Cigarette Smoke  Tobacco smoke can make asthma worse. Do not allow smoking in your home, car or around you.  Be sure no one smokes at a child s day care or school.  If you smoke, ask your health care provider for ways to help you quit.  Ask family members to quit too.  Ask your health care provider for a referral to Quit Plan to help you quit smoking, or call 9-552-692-PLAN.     Colds, Flu, Bronchitis  These are common triggers of asthma. Wash your hands often.  Don t touch your eyes, nose or mouth.  Get a flu shot every year.     Dust Mites  These are tiny bugs that live in cloth or carpet. They are too small to see. Wash sheets and blankets in hot water every week.   Encase pillows and mattress in dust mite proof covers.  Avoid having carpet if you can. If you have carpet, vacuum weekly.   Use a dust mask and HEPA vacuum.   Pollen and Outdoor Mold  Some people are allergic to trees, grass, or weed pollen, or molds. Try to keep your windows closed.  Limit time out doors when pollen count is high.   Ask you health care provider about taking medicine during allergy season.     Animal Dander  Some people are allergic  to skin flakes, urine or saliva from pets with fur or feathers. Keep pets with fur or feathers out of your home.    If you can t keep the pet outdoors, then keep the pet out of your bedroom.  Keep the bedroom door closed.  Keep pets off cloth furniture and away from stuffed toys.     Mice, Rats, and Cockroaches  Some people are allergic to the waste from these pests.   Cover food and garbage.  Clean up spills and food crumbs.  Store grease in the refrigerator.   Keep food out of the bedroom.   Indoor Mold  This can be a trigger if your home has high moisture. Fix leaking faucets, pipes, or other sources of water.   Clean moldy surfaces.  Dehumidify basement if it is damp and smelly.   Smoke, Strong Odors, and Sprays  These can reduce air quality. Stay away from strong odors and sprays, such as perfume, powder, hair spray, paints, smoke incense, paint, cleaning products, candles and new carpet.   Exercise or Sports  Some people with asthma have this trigger. Be active!  Ask your doctor about taking medicine before sports or exercise to prevent symptoms.    Warm up for 5-10 minutes before and after sports or exercise.     Other Triggers of Asthma  Cold air:  Cover your nose and mouth with a scarf.  Sometimes laughing or crying can be a trigger.  Some medicines and food can trigger asthma.

## 2024-08-14 NOTE — PROGRESS NOTES
"Preventive Care Visit  [facility] DARYL Day MD, Family Medicine  Aug 14, 2024      Assessment & Plan     Encounter for Medicare annual wellness exam    - PRIMARY CARE FOLLOW-UP SCHEDULING    Mild intermittent asthma without complication  Stable   - albuterol (PROAIR HFA/PROVENTIL HFA/VENTOLIN HFA) 108 (90 Base) MCG/ACT inhaler; Inhale 2 puffs into the lungs every 6 hours    Family history of colon cancer  UTD with colonoscopy    Dyspareunia in female    - Physical Therapy  Referral; Future    Need for vaccination against respiratory syncytial virus  Advised     Atrophic vaginitis  Call if this is expensive   - estradiol (VIVELLE-DOT) 0.05 MG/24HR bi-weekly patch; Place 1 patch onto the skin twice a week  - Physical Therapy  Referral; Future  Follow up 2 months if not better  IFG (impaired fasting glucose)  Pending   - Glucose; Future  - Hemoglobin A1c; Future  - Glucose  - Hemoglobin A1c    Visit for screening mammogram  Advised   - MA Screen Bilateral w/Renny; Future    Osteopenia, unspecified location  Your bone mass is mildly low, called osteopenia. . We can continue to follow this by repeating the DEXA test in 3 to  4 years. Please take several servings of dairy daily (or calcium 1000 - 1200 mg daily  eg Caltrate , take vitamin D 1000 units daily over-the-counter, exercise regularly, and avoid falls.  We can continue to follow this by repeating the DEXA test in 3 to  4 years.  Please do weight bearing Exercises /walking  daily .        BMI  Estimated body mass index is 27.56 kg/m  as calculated from the following:    Height as of this encounter: 1.697 m (5' 6.81\").    Weight as of this encounter: 79.4 kg (175 lb).       Counseling  Appropriate preventive services were addressed with this patient via screening, questionnaire, or discussion as appropriate for fall prevention, nutrition, physical activity, Tobacco-use cessation, weight loss and cognition.  Checklist " reviewing preventive services available has been given to the patient.  Reviewed patient's diet, addressing concerns and/or questions.   She is at risk for psychosocial distress and has been provided with information to reduce risk.   Discussed possible causes of fatigue.     Regular exercise    Chayo Polo is a 69 year old, presenting for the following:  Physical        8/14/2024     9:49 AM   Additional Questions   Roomed by [facility]         Health Care Directive  Patient does not have a Health Care Directive or Living Will: Discussed advance care planning with patient; information given to patient to review.    HPI  Pt here for a Physical and check meds             8/11/2024   General Health   How would you rate your overall physical health? Good   Feel stress (tense, anxious, or unable to sleep) Only a little      (!) STRESS CONCERN      8/11/2024   Nutrition   Diet: Regular (no restrictions)            8/11/2024   Exercise   Days per week of moderate/strenous exercise 4 days   Average minutes spent exercising at this level 60 min            8/11/2024   Social Factors   Frequency of gathering with friends or relatives Once a week   Worry food won't last until get money to buy more No   Food not last or not have enough money for food? No   Do you have housing? (Housing is defined as stable permanent housing and does not include staying ouside in a car, in a tent, in an abandoned building, in an overnight shelter, or couch-surfing.) Yes   Are you worried about losing your housing? No   Lack of transportation? No   Unable to get utilities (heat,electricity)? No            8/14/2024   Fall Risk   Gait Speed Test (Document in seconds) 3.36   Gait Speed Test Interpretation Less than or equal to 5.00 seconds - PASS               8/11/2024   Activities of Daily Living- Home Safety   Needs help with the following daily activites None of the above   Safety concerns in the home None of the above            8/11/2024    Dental   Dentist two times every year? Yes            8/11/2024   Hearing Screening   Hearing concerns? None of the above            8/11/2024   Driving Risk Screening   Patient/family members have concerns about driving No            8/11/2024   General Alertness/Fatigue Screening   Have you been more tired than usual lately? (!) YES            8/11/2024   Urinary Incontinence Screening   Bothered by leaking urine in past 6 months No            8/11/2024   TB Screening   Were you born outside of the [place]? No            Today's PHQ-2 Score:       8/14/2024     9:32 AM   PHQ-2 ( 1999 Pfizer)   Q1: Little interest or pleasure in doing things 0   Q2: Feeling down, depressed or hopeless 0   PHQ-2 Score 0   Q1: Little interest or pleasure in doing things Not at all   Q2: Feeling down, depressed or hopeless Not at all   PHQ-2 Score 0           8/11/2024   Substance Use   Alcohol more than 3/day or more than 7/wk No   Do you have a current opioid prescription? No   How severe/bad is pain from 1 to 10? 2/10   Do you use any other substances recreationally? No        Social History     Tobacco Use    Smoking status: Never    Smokeless tobacco: Never    Tobacco comments:     Both Parents smoked   Vaping Use    Vaping status: Never Used   Substance Use Topics    Alcohol use: Yes     Comment: One glass once a week or less    Drug use: No           5/18/2023   LAST FHS-7 RESULTS   1st degree relative breast or ovarian cancer No   Any relative bilateral breast cancer No   Any male have breast cancer No   Any ONE woman have BOTH breast AND ovarian cancer No   Any woman with breast cancer before 50yrs No   2 or more relatives with breast AND/OR ovarian cancer No   2 or more relatives with breast AND/OR bowel cancer No           Mammogram referral done      History of abnormal Pap smear: No - age 65 or older with adequate negative prior screening test results (3 consecutive negative cytology results, 2 consecutive negative cotesting  results, or 2 consecutive negative HrHPV test results within 10 years, with the most recent test occurring within the recommended screening interval for the test used)        Latest Ref Rng & Units 2019     9:42 AM 2019     9:31 AM 10/22/2010    12:00 AM   PAP / HPV   PAP (Historical)   NIL  NIL    HPV 16 DNA NEG^Negative Negative      HPV 18 DNA NEG^Negative Negative      Other HR HPV NEG^Negative Negative        ASCVD Risk   The 10-year ASCVD risk score (Elvin FORREST, et al., 2019) is: 9.9%    Values used to calculate the score:      Age: 69 years      Sex: Female      Is Non- : No      Diabetic: No      Tobacco smoker: No      Systolic Blood Pressure: 130 mmHg      Is BP treated: No      HDL Cholesterol: 49 mg/dL      Total Cholesterol: 196 mg/dL    Fracture Risk Assessment Tool  Link to Frax Calculator  Use the information below to complete the Frax calculator  : 1955  Sex: female  Weight (kg): 79.4 kg (actual weight)  Height (cm): 169.7 cm  Previous Fragility Fracture:  No  History of parent with fractured hip:  No  Current Smoking:  No  Patient has been on glucocorticoids for more than 3 months (5mg/day or more): No  Rheumatoid Arthritis on Problem List:  No  Secondary Osteoporosis on Problem List:  No  Consumes 3 or more units of alcohol per day: No  Femoral Neck BMD (g/cm2)            Reviewed and updated as needed this visit by Provider                    Past Medical History:   Diagnosis Date    Arthritis     Asthma     GERD (gastroesophageal reflux disease)      Past Surgical History:   Procedure Laterality Date    ABDOMEN SURGERY  3/31/79    [facility]    C/SECTION, LOW TRANSVERSE      , Low Transverse    COLONOSCOPY      COLONOSCOPY N/A 2024    Procedure: COLONOSCOPY, WITH POLYPECTOMY AND BIOPSY;  Surgeon: Luis Juarez MD;  Location: [facility] OR    COLONOSCOPY N/A 2024    Procedure: COLONOSCOPY, FLEXIBLE, WITH LESION REMOVAL  USING SNARE;  Surgeon: Luis Juarez MD;  Location: [facility] OR    COLONOSCOPY WITH CO2 INSUFFLATION N/A 2024    Procedure: Colonoscopy with CO2 insufflation;  Surgeon: Luis Juarez MD;  Location: [facility] OR    SURGICAL HISTORY OF -   age 9    Eye    TONSILLECTOMY       OB History   No obstetric history on file.     Lab work is in process  Labs reviewed in EPIC  BP Readings from Last 3 Encounters:   24 130/82   24 115/69   23 135/88    Wt Readings from Last 3 Encounters:   24 79.4 kg (175 lb)   23 80.9 kg (178 lb 6.4 oz)   19 72.1 kg (159 lb)                  Patient Active Problem List   Diagnosis    Chronic rhinitis    Intermittent asthma    CARDIOVASCULAR SCREENING; LDL GOAL LESS THAN 160    Chronic cough    Atrophic vaginitis    Family history of colon cancer    Osteopenia, unspecified location     Past Surgical History:   Procedure Laterality Date    ABDOMEN SURGERY  3/31/79    [facility]    C/SECTION, LOW TRANSVERSE      , Low Transverse    COLONOSCOPY      COLONOSCOPY N/A 2024    Procedure: COLONOSCOPY, WITH POLYPECTOMY AND BIOPSY;  Surgeon: Luis Juarez MD;  Location: [facility]    COLONOSCOPY N/A 2024    Procedure: COLONOSCOPY, FLEXIBLE, WITH LESION REMOVAL USING SNARE;  Surgeon: Luis Juarez MD;  Location: [facility]    COLONOSCOPY WITH CO2 INSUFFLATION N/A 2024    Procedure: Colonoscopy with CO2 insufflation;  Surgeon: Luis Juarez MD;  Location: [facility] OR    SURGICAL HISTORY OF -   age 9    Eye    TONSILLECTOMY         Social History     Tobacco Use    Smoking status: Never    Smokeless tobacco: Never    Tobacco comments:     Both Parents smoked   Substance Use Topics    Alcohol use: Yes     Comment: One glass once a week or less     Family History   Problem Relation Age of Onset    Cancer - colorectal Mother         age 78    Cancer Mother         Lung ca age 72    Heart Disease Mother          age 72    Coronary Artery Disease Mother     Hypertension Mother     Hyperlipidemia Mother     Colon Cancer Mother     Other Cancer Mother         Lung Cancer    Alcohol/Drug Father     Alzheimer Disease Father     Depression Brother         1     Depression Sister         has Depression,1 sister has Bipolar    Heart Disease Brother         age 40    Substance Abuse Sister          Current Outpatient Medications   Medication Sig Dispense Refill    albuterol (PROAIR HFA/PROVENTIL HFA/VENTOLIN HFA) 108 (90 Base) MCG/ACT inhaler Inhale 2 puffs into the lungs every 6 hours 6.7 g 3    Fexofenadine HCl (ALLEGRA PO)       fluticasone (FLONASE) 50 MCG/ACT nasal spray Spray 2 sprays into both nostrils daily 9 mL 3    tretinoin (RETIN-A) 0.025 % external cream Apply topically at bedtime       Allergies   Allergen Reactions    Sulfa Antibiotics      Recent Labs   Lab Test 05/18/23  1110 04/08/19  1000   * 89   HDL 49* 51   TRIG 87 130   TSH 3.00  --       Current providers sharing in care for this patient include:  Patient Care Team:  Latanya Day MD as PCP - General  Latanya Day MD as Assigned PCP    The following health maintenance items are reviewed in Epic and correct as of today:  Health Maintenance   Topic Date Due    COVID-19 Vaccine (7 - 2023-24 season) 05/05/2024    ANNUAL REVIEW OF HM ORDERS  05/18/2024    ASTHMA ACTION PLAN  05/18/2024    INFLUENZA VACCINE (1) 09/01/2024    MAMMO SCREENING  01/26/2025    ASTHMA CONTROL TEST  02/14/2025    MEDICARE ANNUAL WELLNESS VISIT  08/14/2025    FALL RISK ASSESSMENT  08/14/2025    GLUCOSE  05/18/2026    LIPID  05/18/2028    COLORECTAL CANCER SCREENING  01/22/2029    ADVANCE CARE PLANNING  08/14/2029    DTAP/TDAP/TD IMMUNIZATION (3 - Td or Tdap) 06/12/2033    DEXA  05/31/2038    HEPATITIS C SCREENING  Completed    PHQ-2 (once per calendar year)  Completed    Pneumococcal Vaccine: 65+ Years  Completed    ZOSTER IMMUNIZATION  Completed    RSV VACCINE (Pregnancy &  "60+)  Completed    IPV IMMUNIZATION  Aged Out    HPV IMMUNIZATION  Aged Out    MENINGITIS IMMUNIZATION  Aged Out    RSV MONOCLONAL ANTIBODY  Aged Out           Asthma Follow-Up    Was ACT completed today?  Yes        8/11/2024     9:37 PM   ACT Total Scores   ACT TOTAL SCORE (Goal Greater than or Equal to 20) 21   In the past 12 months, how many times did you visit the emergency room for your asthma without being admitted to the hospital? 0   In the past 12 months, how many times were you hospitalized overnight because of your asthma? 0        How many days per week do you miss taking your asthma controller medication?  I do not have an asthma controller medication  Please describe any recent triggers for your asthma:  humidity  Have you had any Emergency Room Visits, Urgent Care Visits, or Hospital Admissions since your last office visit?  No    Review of Systems  CONSTITUTIONAL: NEGATIVE for fever, chills, change in weight  INTEGUMENTARY/SKIN: NEGATIVE for worrisome rashes, moles or lesions  EYES: NEGATIVE for vision changes or irritation  ENT/MOUTH: NEGATIVE for ear, mouth and throat problems  RESP: NEGATIVE for significant cough or SOB  BREAST: NEGATIVE for masses, tenderness or discharge  CV: NEGATIVE for chest pain, palpitations or peripheral edema  GI: NEGATIVE for nausea, abdominal pain, heartburn, or change in bowel habits  : NEGATIVE for frequency, dysuria, or hematuria, no bleeding  Has painful [place] due to Dryness   MUSCULOSKELETAL: NEGATIVE for significant arthralgias or myalgia  NEURO: NEGATIVE for weakness, dizziness or paresthesias  ENDOCRINE: NEGATIVE for temperature intolerance, skin/hair changes  HEME: NEGATIVE for bleeding problems  PSYCHIATRIC: NEGATIVE for changes in mood or affect  Rest of the ROS is Negative except see above and Problem list [stable]       Objective    Exam  /82   Pulse 71   Temp 98.2  F (36.8  C) (Temporal)   Resp 16   Ht 1.697 m (5' 6.81\")   Wt 79.4 kg " "(175 lb)   SpO2 99%   BMI 27.56 kg/m     Estimated body mass index is 27.56 kg/m  as calculated from the following:    Height as of this encounter: 1.697 m (5' 6.81\").    Weight as of this encounter: 79.4 kg (175 lb).    Physical Exam  GENERAL: alert and no distress  EYES: Eyes grossly normal to inspection, PERRL and conjunctivae and sclerae normal  HENT: ear canals and TM's normal, nose and mouth without ulcers or lesions  NECK: no adenopathy, no asymmetry, masses, or scars  RESP: lungs clear to auscultation - no rales, rhonchi or wheezes  BREAST: normal without masses, tenderness or nipple discharge and no palpable axillary masses or adenopathy  CV: regular rate and rhythm, normal S1 S2, no S3 or S4, no murmur, click or rub, no peripheral edema  ABDOMEN: soft, nontender, no hepatosplenomegaly, no masses and bowel sounds normal  Pt ayleen not want pelvic Today  MS: no gross musculoskeletal defects noted, no edema  SKIN: no suspicious lesions or rashes  NEURO: Normal strength and tone, mentation intact and speech normal  PSYCH: mentation appears normal, affect normal/bright  Has atrophic vaginitis and pain with [place] -has used Lubricants      8/14/2024   Mini Cog   Clock Draw Score 2 Normal   3 Item Recall 3 objects recalled   Mini Cog Total Score 5                 Signed Electronically by: Latanya Day MD    "

## 2024-08-14 NOTE — PATIENT INSTRUCTIONS
[facility] http://www.[facility].org/inhalers      Patient Education   Preventive Care Advice   This is general advice given by our system to help you stay healthy. However, your care team may have specific advice just for you. Please talk to your care team about your preventive care needs.  Nutrition  Eat 5 or more servings of fruits and vegetables each day.  Try wheat bread, brown rice and whole grain pasta (instead of white bread, rice, and pasta).  Get enough calcium and vitamin D. Check the label on foods and aim for 100% of the RDA (recommended daily allowance).  Lifestyle  Exercise at least 150 minutes each week  (30 minutes a day, 5 days a week).  Do muscle strengthening activities 2 days a week. These help control your weight and prevent disease.  No smoking.  Wear sunscreen to prevent skin cancer.  Have a dental exam and cleaning every 6 months.  Yearly exams  See your health care team every year to talk about:  Any changes in your health.  Any medicines your care team has prescribed.  Preventive care, family planning, and ways to prevent chronic diseases.  Shots (vaccines)   HPV shots (up to age 26), if you've never had them before.  Hepatitis B shots (up to age 59), if you've never had them before.  COVID-19 shot: Get this shot when it's due.  Flu shot: Get a flu shot every year.  Tetanus shot: Get a tetanus shot every 10 years.  Pneumococcal, hepatitis A, and RSV shots: Ask your care team if you need these based on your risk.  Shingles shot (for age 50 and up)  General health tests  Diabetes screening:  Starting at age 35, Get screened for diabetes at least every 3 years.  If you are younger than age 35, ask your care team if you should be screened for diabetes.  Cholesterol test: At age 39, start having a cholesterol test every 5 years, or more often if advised.  Bone density scan (DEXA): At age 50, ask your care team if you should have this scan for osteoporosis (brittle bones).  Hepatitis  C: Get tested at least once in your life.  STIs (sexually transmitted infections)  Before age 24: Ask your care team if you should be screened for STIs.  After age 24: Get screened for STIs if you're at risk. You are at risk for STIs (including HIV) if:  You are sexually active with more than one person.  You don't use condoms every time.  You or a partner was diagnosed with a sexually transmitted infection.  If you are at risk for HIV, ask about PrEP medicine to prevent HIV.  Get tested for HIV at least once in your life, whether you are at risk for HIV or not.  Cancer screening tests  Cervical cancer screening: If you have a cervix, begin getting regular cervical cancer screening tests starting at age 21.  Breast cancer scan (mammogram): If you've ever had breasts, begin having regular mammograms starting at age 40. This is a scan to check for breast cancer.  Colon cancer screening: It is important to start screening for colon cancer at age 45.  Have a colonoscopy test every 10 years (or more often if you're at risk) Or, ask your provider about stool tests like a FIT test every year or Cologuard test every 3 years.  To learn more about your testing options, visit:   .  For help making a decision, visit:   https://bit.ly/as90468.  Prostate cancer screening test: If you have a prostate, ask your care team if a prostate cancer screening test (PSA) at age 55 is right for you.  Lung cancer screening: If you are a current or former smoker ages 50 to 80, ask your care team if ongoing lung cancer screenings are right for you.  For informational purposes only. Not to replace the advice of your health care provider. Copyright   2023 [employer]. All rights reserved. Clinically reviewed by the [facility]. [employer] 157494 - REV 01/24.  Preventing Falls: Care Instructions  Injuries and health problems such as trouble walking or poor eyesight can increase your risk of falling. So can  some medicines. But there are things you can do to help prevent falls. You can exercise to get stronger. You can also arrange your home to make it safer.    Talk to your doctor about the medicines you take. Ask if any of them increase the risk of falls and whether they can be changed or stopped.   Try to exercise regularly. It can help improve your strength and balance. This can help lower your risk of falling.     Practice fall safety and prevention.    Wear low-heeled shoes that fit well and give your feet good support. Talk to your doctor if you have foot problems that make this hard.  Carry a cellphone or wear a medical alert device that you can use to call for help.  Use stepladders instead of chairs to reach high objects. Don't climb if you're at risk for falls. Ask for help, if needed.  Wear the correct eyeglasses, if you need them.    Make your home safer.    Remove rugs, cords, clutter, and furniture from walkways.  Keep your house well lit. Use night-lights in hallways and bathrooms.  Install and use sturdy handrails on stairways.  Wear nonskid footwear, even inside. Don't walk barefoot or in socks without shoes.    Be safe outside.    Use handrails, curb cuts, and ramps whenever possible.  Keep your hands free by using a shoulder bag or backpack.  Try to walk in well-lit areas. Watch out for uneven ground, changes in pavement, and debris.  Be careful in the winter. Walk on the grass or gravel when sidewalks are slippery. Use de-icer on steps and walkways. Add non-slip devices to shoes.    Put grab bars and nonskid mats in your shower or tub and near the toilet. Try to use a shower chair or bath bench when bathing.   Get into a tub or shower by putting in your weaker leg first. Get out with your strong side first. Have a phone or medical alert device in the bathroom with you.   Where can you learn more?  Go to https://www.[employer].net/patiented  Enter G117 in the search box to learn more about  "\"Preventing Falls: Care Instructions.\"  Current as of: July 17, 2023               Content Version: 14.0    1741-0464 [employer].   Care instructions adapted under license by your healthcare professional. If you have questions about a medical condition or this instruction, always ask your healthcare professional. [employer] disclaims any warranty or liability for your use of this information.      Learning About Sleeping Well  What does sleeping well mean?     Sleeping well means getting enough sleep to feel good and stay healthy. How much sleep is enough varies among people.  The number of hours you sleep and how you feel when you wake up are both important. If you do not feel refreshed, you probably need more sleep. Another sign of not getting enough sleep is feeling tired during the day.  Experts recommend that adults get at least 7 or more hours of sleep per day. Children and older adults need more sleep.  Why is getting enough sleep important?  Getting enough quality sleep is a basic part of good health. When your sleep suffers, your physical health, mood, and your thoughts can suffer too. You may find yourself feeling more grumpy or stressed. Not getting enough sleep also can lead to serious problems, including injury, accidents, anxiety, and depression.  What might cause poor sleeping?  Many things can cause sleep problems, including:  Changes to your sleep schedule.  Stress. Stress can be caused by fear about a single event, such as giving a speech. Or you may have ongoing stress, such as worry about work or school.  Depression, anxiety, and other mental or emotional conditions.  Changes in your sleep habits or surroundings. This includes changes that happen where you sleep, such as noise, light, or sleeping in a different bed. It also includes changes in your sleep pattern, such as having jet lag or working a late shift.  Health problems, such as pain, breathing problems, and " "restless legs syndrome.  Lack of regular exercise.  Using alcohol, nicotine, or caffeine before bed.  How can you help yourself?  Here are some tips that may help you sleep more soundly and wake up feeling more refreshed.  Your sleeping area   Use your bedroom only for sleeping and sex. A bit of light reading may help you fall asleep. But if it doesn't, do your reading elsewhere in the house. Try not to use your TV, computer, smartphone, or tablet while you are in bed.  Be sure your bed is big enough to stretch out comfortably, especially if you have a sleep partner.  Keep your bedroom quiet, dark, and cool. Use curtains, blinds, or a sleep mask to block out light. To block out noise, use earplugs, soothing music, or a \"white noise\" machine.  Your evening and bedtime routine   Create a relaxing bedtime routine. You might want to take a warm shower or bath, or listen to soothing music.  Go to bed at the same time every night. And get up at the same time every morning, even if you feel tired.  What to avoid   Limit caffeine (coffee, tea, caffeinated sodas) during the day, and don't have any for at least 6 hours before bedtime.  Avoid drinking alcohol before bedtime. Alcohol can cause you to wake up more often during the night.  Try not to smoke or use tobacco, especially in the evening. Nicotine can keep you awake.  Limit naps during the day, especially close to bedtime.  Avoid lying in bed awake for too long. If you can't fall asleep or if you wake up in the middle of the night and can't get back to sleep within about 20 minutes, get out of bed and go to another room until you feel sleepy.  Avoid taking medicine right before bed that may keep you awake or make you feel hyper or energized. Your doctor can tell you if your medicine may do this and if you can take it earlier in the day.  If you can't sleep   Imagine yourself in a peaceful, pleasant scene. Focus on the details and feelings of being in a place that is " "relaxing.  Get up and do a quiet or boring activity until you feel sleepy.  Avoid drinking any liquids before going to bed to help prevent waking up often to use the bathroom.  Where can you learn more?  Go to https://www.[employer].net/patiented  Enter J942 in the search box to learn more about \"Learning About Sleeping Well.\"  Current as of: July 10, 2023  Content Version: 14.1 2006-2024 [employer].   Care instructions adapted under license by your healthcare professional. If you have questions about a medical condition or this instruction, always ask your healthcare professional. [employer], [employer] disclaims any warranty or liability for your use of this information.       "

## 2024-12-17 ENCOUNTER — PATIENT OUTREACH (OUTPATIENT)
Dept: CARE COORDINATION | Facility: CLINIC | Age: 69
End: 2024-12-17
Payer: COMMERCIAL

## 2025-02-26 ENCOUNTER — E-VISIT (OUTPATIENT)
Dept: FAMILY MEDICINE | Facility: CLINIC | Age: 70
End: 2025-02-26
Payer: COMMERCIAL

## 2025-02-26 DIAGNOSIS — N89.8 VAGINAL DISCHARGE: Primary | ICD-10-CM

## 2025-02-27 ENCOUNTER — LAB (OUTPATIENT)
Dept: LAB | Facility: CLINIC | Age: 70
End: 2025-02-27
Payer: COMMERCIAL

## 2025-02-27 DIAGNOSIS — N89.8 VAGINAL DISCHARGE: ICD-10-CM

## 2025-02-27 LAB
CLUE CELLS: ABNORMAL
TRICHOMONAS, WET PREP: ABNORMAL
WBC'S/HIGH POWER FIELD, WET PREP: ABNORMAL
YEAST, WET PREP: ABNORMAL

## 2025-03-17 ASSESSMENT — ASTHMA QUESTIONNAIRES
QUESTION_5 LAST FOUR WEEKS HOW WOULD YOU RATE YOUR ASTHMA CONTROL: WELL CONTROLLED
QUESTION_2 LAST FOUR WEEKS HOW OFTEN HAVE YOU HAD SHORTNESS OF BREATH: ONCE OR TWICE A WEEK
QUESTION_1 LAST FOUR WEEKS HOW MUCH OF THE TIME DID YOUR ASTHMA KEEP YOU FROM GETTING AS MUCH DONE AT WORK, SCHOOL OR AT HOME: NONE OF THE TIME
ACT_TOTALSCORE: 19
QUESTION_3 LAST FOUR WEEKS HOW OFTEN DID YOUR ASTHMA SYMPTOMS (WHEEZING, COUGHING, SHORTNESS OF BREATH, CHEST TIGHTNESS OR PAIN) WAKE YOU UP AT NIGHT OR EARLIER THAN USUAL IN THE MORNING: ONCE A WEEK
QUESTION_4 LAST FOUR WEEKS HOW OFTEN HAVE YOU USED YOUR RESCUE INHALER OR NEBULIZER MEDICATION (SUCH AS ALBUTEROL): TWO OR THREE TIMES PER WEEK
ACT_TOTALSCORE: 19

## 2025-03-20 ENCOUNTER — OFFICE VISIT (OUTPATIENT)
Dept: FAMILY MEDICINE | Facility: CLINIC | Age: 70
End: 2025-03-20
Payer: COMMERCIAL

## 2025-03-20 VITALS
OXYGEN SATURATION: 99 % | RESPIRATION RATE: 16 BRPM | SYSTOLIC BLOOD PRESSURE: 128 MMHG | BODY MASS INDEX: 30.08 KG/M2 | WEIGHT: 191 LBS | HEART RATE: 76 BPM | DIASTOLIC BLOOD PRESSURE: 84 MMHG | TEMPERATURE: 96.6 F

## 2025-03-20 DIAGNOSIS — N95.0 POSTMENOPAUSAL BLEEDING: Primary | ICD-10-CM

## 2025-03-20 ASSESSMENT — PAIN SCALES - GENERAL: PAINLEVEL_OUTOF10: NO PAIN (0)

## 2025-03-20 NOTE — PROGRESS NOTES
Assessment & Plan     Postmenopausal bleeding  Advised schedule endometrial Biposy to rule Out atypical lesions, CA etc  - US Pelvic Complete with Transvaginal; Future        Subjective   Christ is a 70 year old, presenting for the following health issues:  Vaginal Bleeding        3/20/2025     8:17 AM   Additional Questions   Roomed by Pascale     History of Present Illness       Reason for visit:  Bloody vaginal discharge  Symptom onset:  1-2 weeks ago  Symptoms include:  Mar 2 started experiencing bloody discharge which continued for a little over a weekweek (Stopped estrogen the end of february.)  Symptom intensity:  Mild  Symptom progression:  Improving  Had these symptoms before:  No       Pt has had vaginal dischargeeb 26th-mucosy , slimy discharge  March 2 she has had vaginal Bleeding  for 1 week  No Bleeding at Present  No pain  It does not feel Right  She has Not had any Bleeding in the last week  No pelvic pain            Review of Systems  Rest of the ROS is Negative except see above and Problem list [stable]        Objective    /84   Pulse 76   Temp (!) 96.6  F (35.9  C) (Temporal)   Resp 16   Wt 86.6 kg (191 lb)   SpO2 99%   BMI 30.08 kg/m    Body mass index is 30.08 kg/m .  Physical Exam   GENERAL: alert and no distress  RESP: lungs clear to auscultation - no rales, rhonchi or wheezes  CV: regular rate and rhythm, normal S1 S2, no S3 or S4, no murmur, click or rub, no peripheral edema    (female): normal female external genitalia, normal urethral meatus, normal vaginal mucosa  SKIN: no suspicious lesions or rashes    Pending         Signed Electronically by: Latanya Day MD

## 2025-03-24 ENCOUNTER — ANCILLARY PROCEDURE (OUTPATIENT)
Dept: ULTRASOUND IMAGING | Facility: CLINIC | Age: 70
End: 2025-03-24
Attending: FAMILY MEDICINE
Payer: COMMERCIAL

## 2025-03-24 DIAGNOSIS — N95.0 POSTMENOPAUSAL BLEEDING: ICD-10-CM

## 2025-03-24 PROCEDURE — 76830 TRANSVAGINAL US NON-OB: CPT | Mod: TC | Performed by: RADIOLOGY

## 2025-03-24 PROCEDURE — 76856 US EXAM PELVIC COMPLETE: CPT | Mod: TC | Performed by: RADIOLOGY

## 2025-03-25 ENCOUNTER — OFFICE VISIT (OUTPATIENT)
Dept: FAMILY MEDICINE | Facility: CLINIC | Age: 70
End: 2025-03-25
Payer: COMMERCIAL

## 2025-03-25 VITALS
SYSTOLIC BLOOD PRESSURE: 125 MMHG | WEIGHT: 189 LBS | DIASTOLIC BLOOD PRESSURE: 80 MMHG | RESPIRATION RATE: 16 BRPM | HEART RATE: 80 BPM | BODY MASS INDEX: 29.77 KG/M2 | OXYGEN SATURATION: 98 % | TEMPERATURE: 97.3 F

## 2025-03-25 DIAGNOSIS — N95.0 POSTMENOPAUSAL BLEEDING: Primary | ICD-10-CM

## 2025-03-25 PROCEDURE — 99213 OFFICE O/P EST LOW 20 MIN: CPT | Mod: 25 | Performed by: FAMILY MEDICINE

## 2025-03-25 PROCEDURE — 1126F AMNT PAIN NOTED NONE PRSNT: CPT | Performed by: FAMILY MEDICINE

## 2025-03-25 PROCEDURE — 58100 BIOPSY OF UTERUS LINING: CPT | Performed by: FAMILY MEDICINE

## 2025-03-25 PROCEDURE — 3074F SYST BP LT 130 MM HG: CPT | Performed by: FAMILY MEDICINE

## 2025-03-25 PROCEDURE — 3079F DIAST BP 80-89 MM HG: CPT | Performed by: FAMILY MEDICINE

## 2025-03-25 ASSESSMENT — PAIN SCALES - GENERAL: PAINLEVEL_OUTOF10: NO PAIN (0)

## 2025-03-25 NOTE — PROGRESS NOTES
Assessment & Plan     Postmenopausal bleeding    - Ob/Gyn  Referral; Future  -Unable to do EMB as Cervix very Tight  Pelvic Ultrasound reviewed -endometrium normal  Pt has Not had  any Further vaginal Bleeding  She has been advised to see Dr Moore  She will use  KY gel if needed with sexual activity  If EMB is normal she can consider Premarin Vaginal Cream      Subjective   Christ is a 70 year old, presenting for the following health issues:  Endometrial biopsy      3/25/2025     9:47 AM   Additional Questions   Roomed by Pascale     History of Present Illness       Reason for visit:  Endometriol biopsy    She eats 4 or more servings of fruits and vegetables daily.She consumes 0 sweetened beverage(s) daily.She exercises with enough effort to increase her heart rate 30 to 60 minutes per day.  She exercises with enough effort to increase her heart rate 3 or less days per week.   She is taking medications regularly.    Pt is here for endometrial Biopsy  HRT have been stopped              Objective    /80   Pulse 80   Temp 97.3  F (36.3  C) (Temporal)   Resp 16   Wt 85.7 kg (189 lb)   SpO2 98%   BMI 29.77 kg/m    Body mass index is 29.77 kg/m .  Physical Exam   Rest of the ROS is Negative except see above and Problem list [stable]              Signed Electronically by: Latanya Day MD    INDICATIONS:                                                    Is a pregnancy test required: No.  Was a consent obtained?  Yes    Having endometrial biopsy for post-menopausal bleeding    Today's PHQ-2 Score:       3/20/2025     8:11 AM   PHQ-2 ( 1999 [employer])   Q1: Little interest or pleasure in doing things 0   Q2: Feeling down, depressed or hopeless 0   PHQ-2 Score 0    Q1: Little interest or pleasure in doing things Not at all   Q2: Feeling down, depressed or hopeless Not at all   PHQ-2 Score 0       Patient-reported       PROCEDURE;                                                    Under sterile   Precautions  A speculum was placed in the vagina and cervix prepped with betadine. A tenaculum was attached to the cervix.   Cervix was very Tight and Procedure was unable to complete Procedure  POST PROCEDURE;                                                      There  was no cramping at the time of discharge. She  tolerated the procedure well. There were no complications. Patient was discharged in stable condition.    Patient advised to call the clinic if severe pelvic pain, fever or heavy bleeding.    Latanya Day MD

## 2025-04-08 ENCOUNTER — OFFICE VISIT (OUTPATIENT)
Dept: OBGYN | Facility: CLINIC | Age: 70
End: 2025-04-08
Attending: FAMILY MEDICINE
Payer: COMMERCIAL

## 2025-04-08 VITALS
OXYGEN SATURATION: 97 % | WEIGHT: 190.6 LBS | HEART RATE: 79 BPM | DIASTOLIC BLOOD PRESSURE: 84 MMHG | SYSTOLIC BLOOD PRESSURE: 136 MMHG | BODY MASS INDEX: 30.02 KG/M2

## 2025-04-08 DIAGNOSIS — N95.0 POSTMENOPAUSAL BLEEDING: Primary | ICD-10-CM

## 2025-04-08 DIAGNOSIS — N94.10 DYSPAREUNIA IN FEMALE: ICD-10-CM

## 2025-04-08 DIAGNOSIS — N88.2 CERVICAL OS STENOSIS: ICD-10-CM

## 2025-04-08 DIAGNOSIS — N95.2 ATROPHIC VAGINITIS: ICD-10-CM

## 2025-04-08 PROCEDURE — 3075F SYST BP GE 130 - 139MM HG: CPT | Performed by: OBSTETRICS & GYNECOLOGY

## 2025-04-08 PROCEDURE — 99204 OFFICE O/P NEW MOD 45 MIN: CPT | Performed by: OBSTETRICS & GYNECOLOGY

## 2025-04-08 PROCEDURE — 3079F DIAST BP 80-89 MM HG: CPT | Performed by: OBSTETRICS & GYNECOLOGY

## 2025-04-08 NOTE — PROGRESS NOTES
Christ is a 70 year old  referred here by Dr. Day with complaint of postmenopausal bleeding.    On , she gets up in the night, she has a big gush of liquid and it is not urine.  It was a little slimy.  She had successive nights with less and less gushes.  On , she had some pinkish discharge, for about 7 days.   She met with Dr. Day and she attempted an endometrial biopsy, but the cervix was noted to be stenotic.  She was taking Estradiol (Vivelle dot) in Mid , and she stopped it in , when she had the gush noted above. She used liners when she developed the pinkish discharge.   She has had associated dyspareunia.  The estradiol helped with this.      EXAM: US PELVIC TRANSABDOMINAL AND TRANSVAGINAL  LOCATION: [facility]  DATE: 3/24/2025  INDICATION:  Postmenopausal bleeding  COMPARISON: None.  TECHNIQUE: Transabdominal scans were performed. Endovaginal ultrasound was performed to better visualize the adnexa.  FINDINGS:  UTERUS: 7.9 x 5.2 x 3.2 cm. Normal in size and position with no masses.  ENDOMETRIUM: 4 mm. Normal smooth endometrium.  RIGHT OVARY: Not visualized due to overlying bowel gas.   LEFT OVARY: Not visualized due to overlying bowel gas.   No significant free fluid.                                                             IMPRESSION:  1.  Normal endometrium.  2.  The ovaries are not visualized.       Past Medical History:   Diagnosis Date    Arthritis     Asthma     GERD (gastroesophageal reflux disease)        Past Surgical History:   Procedure Laterality Date    ABDOMEN SURGERY  3/31/79    [facility]    C/SECTION, LOW TRANSVERSE      , Low Transverse    COLONOSCOPY      COLONOSCOPY N/A 2024    Procedure: COLONOSCOPY, WITH POLYPECTOMY AND BIOPSY;  Surgeon: Luis Juarez MD;  Location: [facility] OR    COLONOSCOPY N/A 2024    Procedure: COLONOSCOPY, FLEXIBLE, WITH LESION REMOVAL USING SNARE;  Surgeon:  Luis Juarez MD;  Location: [facility] OR    COLONOSCOPY WITH CO2 INSUFFLATION N/A 2024    Procedure: Colonoscopy with CO2 insufflation;  Surgeon: Luis Juarez MD;  Location: [facility] OR    SURGICAL HISTORY OF -   age 9    Eye    TONSILLECTOMY         OB History    Para Term  AB Living   1 0 0 0 0 1   SAB IAB Ectopic Multiple Live Births   0 0 0 0 1      # Outcome Date GA Lbr Sonido/2nd Weight Sex Type Anes PTL Lv   1       CS-LTranv          Gynecological History            No LMP recorded. Patient is postmenopausal.     No STD/No PID/No IUD     See above HPI        Allergies   Allergen Reactions    Sulfa Antibiotics        Current Outpatient Medications   Medication Sig Dispense Refill    albuterol (PROAIR HFA/PROVENTIL HFA/VENTOLIN HFA) 108 (90 Base) MCG/ACT inhaler Inhale 2 puffs into the lungs every 6 hours 6.7 g 3    Fexofenadine HCl (ALLEGRA PO)       tretinoin (RETIN-A) 0.025 % external cream Apply topically at bedtime         Social History     Socioeconomic History    Marital status:      Spouse name: Not on file    Number of children: 1    Years of education: Not on file    Highest education level: Not on file   Occupational History    Occupation:    Tobacco Use    Smoking status: Never    Smokeless tobacco: Never    Tobacco comments:     Both Parents smoked   Vaping Use    Vaping status: Never Used   Substance and Sexual Activity    Alcohol use: Yes     Comment: One glass once a week or less    Drug use: No    Sexual activity: Not Currently     Partners: Male     Birth control/protection: Post-menopausal   Other Topics Concern     Service No    Blood Transfusions No    Caffeine Concern No    Occupational Exposure No    Hobby Hazards No    Sleep Concern No    Stress Concern No    Weight Concern Yes    Special Diet Yes     Comment: low fat    Back Care No    Exercise Yes    Bike Helmet Yes     Comment: no helmet    Seat Belt Yes     Self-Exams Yes    Parent/sibling w/ CABG, MI or angioplasty before 65F 55M? No   Social History Narrative    Not on file     Social Drivers of Health     Financial Resource Strain: Low Risk  (8/11/2024)    Financial Resource Strain     Within the past 12 months, have you or your family members you live with been unable to get utilities (heat, electricity) when it was really needed?: No   Food Insecurity: Low Risk  (8/11/2024)    Food Insecurity     Within the past 12 months, did you worry that your food would run out before you got money to buy more?: No     Within the past 12 months, did the food you bought just not last and you didn t have money to get more?: No   Transportation Needs: Low Risk  (8/11/2024)    Transportation Needs     Within the past 12 months, has lack of transportation kept you from medical appointments, getting your medicines, non-medical meetings or appointments, work, or from getting things that you need?: No   Physical Activity: Sufficiently Active (8/11/2024)    Exercise Vital Sign     Days of Exercise per Week: 4 days     Minutes of Exercise per Session: 60 min   Stress: No Stress Concern Present (8/11/2024)    [employer] - Occupational Stress Questionnaire     Feeling of Stress : Only a little   Social Connections: Unknown (8/11/2024)    Social Connection and Isolation Panel [NHANES]     Frequency of Communication with Friends and Family: Not on file     Frequency of Social Gatherings with Friends and Family: Once a week     Attends Oriental orthodox Services: Not on file     Active Member of Clubs or Organizations: Not on file     Attends Club or Organization Meetings: Not on file     Marital Status: Not on file   Interpersonal Safety: Low Risk  (3/20/2025)    Interpersonal Safety     Do you feel physically and emotionally safe where you currently live?: Yes     Within the past 12 months, have you been hit, slapped, kicked or otherwise physically hurt by someone?:  No     Within the past 12 months, have you been humiliated or emotionally abused in other ways by your partner or ex-partner?: No   Housing Stability: Low Risk  (8/11/2024)    Housing Stability     Do you have housing? : Yes     Are you worried about losing your housing?: No       Family History   Problem Relation Age of Onset    Cancer - colorectal Mother         age 78    Cancer Mother         Lung ca age 72    Heart Disease Mother         age 72    Coronary Artery Disease Mother     Hypertension Mother     Hyperlipidemia Mother     Colon Cancer Mother     Other Cancer Mother         Lung Cancer    Alcohol/Drug Father     Alzheimer Disease Father     Depression Brother         1     Depression Sister         has Depression,1 sister has Bipolar    Heart Disease Brother         age 40    Substance Abuse Sister          Review of Systems:  10 point ROS of systems including Constitutional, Eyes, Respiratory, Cardiovascular, Gastroenterology, Genitourinary, Integumentary, Muscularskeletal, Psychiatric were all negative except for pertinent positives noted in my HPI and in the PMH.          EXAM:  /84 (BP Location: Right arm, Cuff Size: Adult Large)   Pulse 79   Wt 86.5 kg (190 lb 9.6 oz)   SpO2 97%   BMI 30.02 kg/m    Body mass index is 30.02 kg/m .  General Appearance:  healthy, alert, active, no distress  Skin:  Normal skin turgor  Neuro:  Alert, cranial nerves grossly intact  HEENT: NCAT  Neck:  No masses or lesions carotids are +2/4. No bruits heard  Lungs:  Good respiratory effort   Abdomen: Soft, nontender.  Normal bowel sounds.  No masses  Vulva: No external lesions, normal hair distribution, no adenopathy  BUS:  Normal, no masses noted  Urethral meatus:  No masses noted  Urethra:  No hypermobility  Vagina: Moist, pink, no abnormal discharge, well rugated, no lesions  Cervix: Smooth, pink, no visible lesions  Uterus: Normal size, anteverted, non-tender, mobile  Ovaries: No mass, non-tender,  mobile  Extremities:  No clubbing, cyanosis or edema.        ASSESSMENT:  (N95.0) Postmenopausal bleeding  (N88.2) Cervical os stenosis   (N94.10)  Dyspareunia   (N95.2) vaginal atrophy     PLAN:  We discussed the bleeding and the cervical stenosis.  The bleeding and discharge may be from the estrogen use.  The estrogen has helped with the dyspareunia and the atrophic vaginal changes.  The dyspareunia and the atrophic vaginal changes, in addition to the lack of aggravating medical factors and medications such as the SERMs, suggest the bleeding may be from atrophic changes.  The vaginal ultrasound results are reviewed and the endometrial stripe is less than or equal to 4 mm, which is in the normal ranges.  Since the bleeding occurs in close time proximity to the estrogen use, and the ultrasound shows the smooth endometrium, she does not need further evaluation at this time.  However, should the bleeding continue, then further evaluation should be considered.  With the cervical os stenosis, this would likely be accomplished with the D&C and hysteroscopy.   We also discussed HT and the goals and limitations.    We reviewed the WHI study that found increase risk in VTE and the cardiovascular incidents associated with the HT.  The WHI was developed with the primary end point related to cardiac disease.  The initial reports showed an increase risk in the first year, equally out over the next couple of years, and then the following years the HT was beneficial.   New information not associated with the WHI, has suggested the benefit of HT regarding cardiac disease, and other medications, such as the statins, do not have beneficial profiles.  I also reviewed with the patient the slight increase in breast cancer in the combined HRT arm (not the ERT arm regarding breast CA).  I also reviewed with her the Confidence Interval with her and the CI was from 1.0 to 1.59.  Even though the breast cancer risk increased by 24%, It is  not statistically significant since the CI hit 1.  We also reviewed Radha Castellanos's last study of the meta-analysis of the best studies regarding HT back to the 50s.  Her conclusion was no conclusive evidence exists showing increase risk with breast cancer.  I also reviewed with her about the real benefits which include the osteoporosis and fracture risk reduction.  This discussion also dealt with the other medications for fracture risk reduction.  I also reviewed with her about the incidence of colon cancer of 1 in 10 in the general population, but the WHI showed reduction in the combination arm that was significant with the CI range of 0.89 to 0.53.  The theoretical risk regarding the breast cancer risk and the benefits regarding colon cancer are likely related to the progestin use.   We discussed the current recommendation to be on the lowest HT dose possible, for the shortest amount of time.  Other studies are currently being performed, which may change the recommendations.  I recommend to review the HT use on a yearly basis as new information may be released, impacting her choice.  She voiced her understanding.    Questions seem to be answered.     Total time preparing to see patient with reviewing prior encounter and labs, face to face time, coordinating care and documentation, on the same calendar date:  50 minutes.      Josh Moore MD

## 2025-04-08 NOTE — PATIENT INSTRUCTIONS
If you have any questions regarding your visit, Please contact your care team.    To Schedule an Appointment 24/7  Call: [phone]  [facility]  TELEPHONE NUMBER   Josh Moore M.D.    [place]-Medical Assistant    Dayami Pressley-Surgery Scheduler  [place]- Tuesday-[place]                   7:30 a.m.-3:30 p.m.  Wednesday-[place]             8:00 a.m.-4:30 p.m.  Thursday-[place]     8:00 a.m.-4:00 p.m.  Friday-[place]                       7:30 a.m.-1:00 p.m. [facility]  [address] [place] [address].  [place] [address] [phone] Phone  [phone] Fax    Imaging Scheduling all locations  [phone]      [facility]  [address]  [place], [place] [address] [phone]    [facility]  [address] [place] [address] [phone] ask for Women's Clinic     **Surgeries** Our Surgery Schedulers will contact you to schedule. If you do not receive a call within 3 business days, please call [phone].    Urgent Care locations:  [facility] Monday-Friday                 10 am - 8 pm  Saturday and Sunday        9 am - 5 pm   [phone]   If you need a medication refill, please contact your pharmacy. Please allow 3 business days for your refill to be completed.  As always, Thank you for trusting us with your healthcare needs!  If you have any questions regarding your visit, Please contact your care team.    [facility]: [phone]    To Schedule an Appointment 24/7  Call: [phone]    see additional instructions from your care team below

## 2025-06-25 ENCOUNTER — ANCILLARY PROCEDURE (OUTPATIENT)
Dept: MAMMOGRAPHY | Facility: CLINIC | Age: 70
End: 2025-06-25
Attending: FAMILY MEDICINE
Payer: COMMERCIAL

## 2025-06-25 DIAGNOSIS — Z12.31 VISIT FOR SCREENING MAMMOGRAM: ICD-10-CM

## 2025-06-25 PROCEDURE — 77067 SCR MAMMO BI INCL CAD: CPT | Mod: TC | Performed by: RADIOLOGY

## 2025-06-25 PROCEDURE — 77063 BREAST TOMOSYNTHESIS BI: CPT | Mod: TC | Performed by: RADIOLOGY

## (undated) DEVICE — KIT ENDO FIRST STEP DISINFECTANT 200ML W/POUCH EP-4

## (undated) DEVICE — PAD CHUX UNDERPAD 23X24" 7136

## (undated) RX ORDER — FENTANYL CITRATE 50 UG/ML
INJECTION, SOLUTION INTRAMUSCULAR; INTRAVENOUS
Status: DISPENSED
Start: 2024-01-22